# Patient Record
Sex: FEMALE | Race: BLACK OR AFRICAN AMERICAN | Employment: FULL TIME | URBAN - METROPOLITAN AREA
[De-identification: names, ages, dates, MRNs, and addresses within clinical notes are randomized per-mention and may not be internally consistent; named-entity substitution may affect disease eponyms.]

---

## 2021-11-29 ENCOUNTER — APPOINTMENT (OUTPATIENT)
Dept: GENERAL RADIOLOGY | Age: 51
End: 2021-11-29
Payer: COMMERCIAL

## 2021-11-29 ENCOUNTER — HOSPITAL ENCOUNTER (EMERGENCY)
Age: 51
Discharge: HOME | End: 2021-11-29
Payer: COMMERCIAL

## 2021-11-29 ENCOUNTER — APPOINTMENT (OUTPATIENT)
Dept: CT IMAGING | Age: 51
End: 2021-11-29
Payer: COMMERCIAL

## 2021-11-29 VITALS
SYSTOLIC BLOOD PRESSURE: 156 MMHG | TEMPERATURE: 98.3 F | HEART RATE: 71 BPM | BODY MASS INDEX: 28.51 KG/M2 | WEIGHT: 151 LBS | OXYGEN SATURATION: 98 % | DIASTOLIC BLOOD PRESSURE: 88 MMHG | RESPIRATION RATE: 16 BRPM | HEIGHT: 61 IN

## 2021-11-29 DIAGNOSIS — M25.561 ACUTE PAIN OF RIGHT KNEE: ICD-10-CM

## 2021-11-29 DIAGNOSIS — R10.84 GENERALIZED ABDOMINAL PAIN: Primary | ICD-10-CM

## 2021-11-29 LAB
A/G RATIO: 1.6 (ref 1.1–2.2)
ALBUMIN SERPL-MCNC: 4.2 G/DL (ref 3.4–5)
ALP BLD-CCNC: 65 U/L (ref 40–129)
ALT SERPL-CCNC: 20 U/L (ref 10–40)
ANION GAP SERPL CALCULATED.3IONS-SCNC: 11 MMOL/L (ref 3–16)
AST SERPL-CCNC: 24 U/L (ref 15–37)
BASOPHILS ABSOLUTE: 0 K/UL (ref 0–0.2)
BASOPHILS RELATIVE PERCENT: 0.6 %
BILIRUB SERPL-MCNC: <0.2 MG/DL (ref 0–1)
BILIRUBIN URINE: NEGATIVE
BLOOD, URINE: ABNORMAL
BUN BLDV-MCNC: 11 MG/DL (ref 7–20)
CALCIUM SERPL-MCNC: 9.4 MG/DL (ref 8.3–10.6)
CHLORIDE BLD-SCNC: 103 MMOL/L (ref 99–110)
CLARITY: ABNORMAL
CO2: 24 MMOL/L (ref 21–32)
COLOR: YELLOW
CREAT SERPL-MCNC: 0.8 MG/DL (ref 0.6–1.1)
EKG ATRIAL RATE: 55 BPM
EKG DIAGNOSIS: NORMAL
EKG P AXIS: 44 DEGREES
EKG P-R INTERVAL: 160 MS
EKG Q-T INTERVAL: 412 MS
EKG QRS DURATION: 84 MS
EKG QTC CALCULATION (BAZETT): 394 MS
EKG R AXIS: 13 DEGREES
EKG T AXIS: 32 DEGREES
EKG VENTRICULAR RATE: 55 BPM
EOSINOPHILS ABSOLUTE: 0 K/UL (ref 0–0.6)
EOSINOPHILS RELATIVE PERCENT: 0.1 %
EPITHELIAL CELLS, UA: ABNORMAL /HPF (ref 0–5)
GFR AFRICAN AMERICAN: >60
GFR NON-AFRICAN AMERICAN: >60
GLUCOSE BLD-MCNC: 94 MG/DL (ref 70–99)
GLUCOSE URINE: NEGATIVE MG/DL
HCG(URINE) PREGNANCY TEST: NEGATIVE
HCT VFR BLD CALC: 44 % (ref 36–48)
HEMOGLOBIN: 14.7 G/DL (ref 12–16)
KETONES, URINE: NEGATIVE MG/DL
LEUKOCYTE ESTERASE, URINE: NEGATIVE
LIPASE: 39 U/L (ref 13–60)
LYMPHOCYTES ABSOLUTE: 1.9 K/UL (ref 1–5.1)
LYMPHOCYTES RELATIVE PERCENT: 37.5 %
MCH RBC QN AUTO: 29.3 PG (ref 26–34)
MCHC RBC AUTO-ENTMCNC: 33.4 G/DL (ref 31–36)
MCV RBC AUTO: 87.5 FL (ref 80–100)
MICROSCOPIC EXAMINATION: YES
MONOCYTES ABSOLUTE: 0.6 K/UL (ref 0–1.3)
MONOCYTES RELATIVE PERCENT: 11.3 %
MUCUS: ABNORMAL /LPF
NEUTROPHILS ABSOLUTE: 2.5 K/UL (ref 1.7–7.7)
NEUTROPHILS RELATIVE PERCENT: 50.5 %
NITRITE, URINE: NEGATIVE
PDW BLD-RTO: 14.4 % (ref 12.4–15.4)
PH UA: 6.5 (ref 5–8)
PLATELET # BLD: 228 K/UL (ref 135–450)
PMV BLD AUTO: 8.7 FL (ref 5–10.5)
POTASSIUM REFLEX MAGNESIUM: 4 MMOL/L (ref 3.5–5.1)
PROTEIN UA: NEGATIVE MG/DL
RBC # BLD: 5.03 M/UL (ref 4–5.2)
RBC UA: ABNORMAL /HPF (ref 0–4)
SODIUM BLD-SCNC: 138 MMOL/L (ref 136–145)
SPECIFIC GRAVITY UA: 1.02 (ref 1–1.03)
TOTAL PROTEIN: 6.8 G/DL (ref 6.4–8.2)
TROPONIN: <0.01 NG/ML
URINE REFLEX TO CULTURE: ABNORMAL
URINE TYPE: ABNORMAL
UROBILINOGEN, URINE: 0.2 E.U./DL
WBC # BLD: 5 K/UL (ref 4–11)
WBC UA: ABNORMAL /HPF (ref 0–5)

## 2021-11-29 PROCEDURE — 96375 TX/PRO/DX INJ NEW DRUG ADDON: CPT

## 2021-11-29 PROCEDURE — 36415 COLL VENOUS BLD VENIPUNCTURE: CPT

## 2021-11-29 PROCEDURE — 93010 ELECTROCARDIOGRAM REPORT: CPT | Performed by: INTERNAL MEDICINE

## 2021-11-29 PROCEDURE — 6370000000 HC RX 637 (ALT 250 FOR IP): Performed by: NURSE PRACTITIONER

## 2021-11-29 PROCEDURE — 6360000002 HC RX W HCPCS: Performed by: NURSE PRACTITIONER

## 2021-11-29 PROCEDURE — 84484 ASSAY OF TROPONIN QUANT: CPT

## 2021-11-29 PROCEDURE — 2580000003 HC RX 258: Performed by: NURSE PRACTITIONER

## 2021-11-29 PROCEDURE — 83690 ASSAY OF LIPASE: CPT

## 2021-11-29 PROCEDURE — 71045 X-RAY EXAM CHEST 1 VIEW: CPT

## 2021-11-29 PROCEDURE — 74177 CT ABD & PELVIS W/CONTRAST: CPT

## 2021-11-29 PROCEDURE — 99284 EMERGENCY DEPT VISIT MOD MDM: CPT

## 2021-11-29 PROCEDURE — 6360000004 HC RX CONTRAST MEDICATION: Performed by: NURSE PRACTITIONER

## 2021-11-29 PROCEDURE — 96374 THER/PROPH/DIAG INJ IV PUSH: CPT

## 2021-11-29 PROCEDURE — 93005 ELECTROCARDIOGRAM TRACING: CPT | Performed by: NURSE PRACTITIONER

## 2021-11-29 PROCEDURE — 80053 COMPREHEN METABOLIC PANEL: CPT

## 2021-11-29 PROCEDURE — 84703 CHORIONIC GONADOTROPIN ASSAY: CPT

## 2021-11-29 PROCEDURE — 81001 URINALYSIS AUTO W/SCOPE: CPT

## 2021-11-29 PROCEDURE — 85025 COMPLETE CBC W/AUTO DIFF WBC: CPT

## 2021-11-29 RX ORDER — ONDANSETRON 2 MG/ML
4 INJECTION INTRAMUSCULAR; INTRAVENOUS ONCE
Status: COMPLETED | OUTPATIENT
Start: 2021-11-29 | End: 2021-11-29

## 2021-11-29 RX ORDER — 0.9 % SODIUM CHLORIDE 0.9 %
1000 INTRAVENOUS SOLUTION INTRAVENOUS ONCE
Status: COMPLETED | OUTPATIENT
Start: 2021-11-29 | End: 2021-11-29

## 2021-11-29 RX ORDER — DICYCLOMINE HYDROCHLORIDE 10 MG/1
10 CAPSULE ORAL
Qty: 20 CAPSULE | Refills: 0 | Status: SHIPPED | OUTPATIENT
Start: 2021-11-29 | End: 2021-11-29 | Stop reason: SDUPTHER

## 2021-11-29 RX ORDER — MORPHINE SULFATE 4 MG/ML
4 INJECTION, SOLUTION INTRAMUSCULAR; INTRAVENOUS ONCE
Status: COMPLETED | OUTPATIENT
Start: 2021-11-29 | End: 2021-11-29

## 2021-11-29 RX ORDER — DICYCLOMINE HYDROCHLORIDE 10 MG/1
10 CAPSULE ORAL
Qty: 20 CAPSULE | Refills: 0 | Status: SHIPPED | OUTPATIENT
Start: 2021-11-29 | End: 2021-12-02 | Stop reason: SDUPTHER

## 2021-11-29 RX ORDER — DICYCLOMINE HYDROCHLORIDE 10 MG/1
10 CAPSULE ORAL ONCE
Status: COMPLETED | OUTPATIENT
Start: 2021-11-29 | End: 2021-11-29

## 2021-11-29 RX ADMIN — DICYCLOMINE HYDROCHLORIDE 10 MG: 10 CAPSULE ORAL at 17:35

## 2021-11-29 RX ADMIN — ONDANSETRON 4 MG: 2 INJECTION INTRAMUSCULAR; INTRAVENOUS at 15:12

## 2021-11-29 RX ADMIN — MORPHINE SULFATE 4 MG: 4 INJECTION INTRAVENOUS at 15:12

## 2021-11-29 RX ADMIN — IOPAMIDOL 100 ML: 755 INJECTION, SOLUTION INTRAVENOUS at 16:00

## 2021-11-29 RX ADMIN — SODIUM CHLORIDE 1000 ML: 9 INJECTION, SOLUTION INTRAVENOUS at 15:09

## 2021-11-29 ASSESSMENT — PAIN DESCRIPTION - PAIN TYPE
TYPE: ACUTE PAIN
TYPE: ACUTE PAIN

## 2021-11-29 ASSESSMENT — PAIN SCALES - GENERAL
PAINLEVEL_OUTOF10: 6
PAINLEVEL_OUTOF10: 4
PAINLEVEL_OUTOF10: 10
PAINLEVEL_OUTOF10: 10

## 2021-11-29 ASSESSMENT — PAIN DESCRIPTION - LOCATION
LOCATION: ABDOMEN

## 2021-11-29 ASSESSMENT — PAIN DESCRIPTION - DESCRIPTORS: DESCRIPTORS: SHARP

## 2021-11-29 ASSESSMENT — PAIN - FUNCTIONAL ASSESSMENT: PAIN_FUNCTIONAL_ASSESSMENT: 0-10

## 2021-11-29 ASSESSMENT — PAIN DESCRIPTION - ORIENTATION: ORIENTATION: LOWER

## 2021-11-29 NOTE — ED PROVIDER NOTES
[facility] ENCOUNTER        Pt Name: Shameka Li  MRN: [id number]  [place] 1970  Date of evaluation: 2021  Provider: DEVAN Khalil CNP  PCP: No primary care provider on file. Note Started: 2:28 PM EST       HANNAH. I have evaluated this patient. My supervising physician was available for consultation. CHIEF COMPLAINT       Chief Complaint   Patient presents with    Abdominal Pain     across lower abd x 1 day, Denies N/V, had some diarrhea today    Knee Pain     right knee intermittent  pain and swelling for past year. Stated more difficult to walk       HISTORY OF PRESENT ILLNESS   (Location, Timing/Onset, Context/Setting, Quality, Duration, Modifying Factors, Severity, Associated Signs and Symptoms)  Note limiting factors. Chief Complaint: Abdominal pain    Shameka Li is a 49 y.o. female who presents urgency department with complaints of generalized abdominal pain and cramping since yesterday. Pain is progressively been getting worse and is now more sharp and stabbing in nature. Denies any history of similar symptoms. She is only been able to eat a few bites of food or she had a few bites of hamburger before she came in today. Denies having a recent EGD or colonoscopy. Denies prior any abdominal surgery except for a . Denies taking medication for the symptoms. Denies any associated nausea, vomiting, diarrhea, urinary symptoms, rashes, fevers, headache, neck pain, back pain, lightheaded, dizzy, confusion, syncope, leg swelling. She is also complaining of continued pain and swelling to the right knee. She has used an Ace wrap and taken NSAIDs without relief. She is asking for referral to PCP and orthopedist.     Nursing Notes were all reviewed and agreed with or any disagreements were addressed in the HPI.     REVIEW OF SYSTEMS    (2-9 systems for level 4, 10 or more for level 5)     Review of Systems    Positives and Pertinent negatives as per HPI. Except as noted above in the ROS, all other systems were reviewed and negative. PAST MEDICAL HISTORY   History reviewed. No pertinent past medical history. SURGICAL HISTORY     Past Surgical History:   Procedure Laterality Date     SECTION           CURRENTMEDICATIONS       Previous Medications    No medications on file         ALLERGIES     Patient has no known allergies. FAMILYHISTORY     History reviewed. No pertinent family history. SOCIAL HISTORY       Social History     Tobacco Use    Smoking status: Current Every Day Smoker     Packs/day: 0.25    Smokeless tobacco: Never Used   Substance Use Topics    Alcohol use: No    Drug use: Not Currently       SCREENINGS             PHYSICAL EXAM    (up to 7 for level 4, 8 or more for level 5)     ED Triage Vitals [21 1413]   BP Temp Temp Source Pulse Resp SpO2 Height Weight   (!) 156/88 98.3 °F (36.8 °C) Oral 71 16 98 % 5' 1\" (1.549 m) 151 lb (68.5 kg)       Physical Exam  Vitals and nursing note reviewed. Constitutional:       General: She is awake. Appearance: Normal appearance. She is well-developed and overweight. HENT:      Head: Normocephalic and atraumatic. Nose: Nose normal.   Eyes:      General:         Right eye: No discharge. Left eye: No discharge. Cardiovascular:      Rate and Rhythm: Normal rate and regular rhythm. Heart sounds: Normal heart sounds. Pulmonary:      Effort: Pulmonary effort is normal. No respiratory distress. Breath sounds: Normal breath sounds. Abdominal:      General: Bowel sounds are normal.      Palpations: Abdomen is soft. Tenderness: There is abdominal tenderness in the right upper quadrant, epigastric area and left upper quadrant. Musculoskeletal:         General: Normal range of motion. Cervical back: Normal range of motion. Skin:     General: Skin is warm and dry.       Coloration: Skin is not pale. Neurological:      Mental Status: She is alert and oriented to person, place, and time. Psychiatric:         Behavior: Behavior normal. Behavior is cooperative. DIAGNOSTIC RESULTS   LABS:    Labs Reviewed   URINE RT REFLEX TO CULTURE - Abnormal; Notable for the following components:       Result Value    Clarity, UA SL CLOUDY (*)     Blood, Urine MODERATE (*)     All other components within normal limits    Narrative:     Performed at:  [facility]  [address] [place], [place]   Phone [phone]   MICROSCOPIC URINALYSIS - Abnormal; Notable for the following components:    Mucus, UA 1+ (*)     Epithelial Cells, UA 11-20 (*)     All other components within normal limits    Narrative:     Performed at:  [facility]  [address] [place], [place]   Phone [phone]   PREGNANCY, URINE    Narrative:     Performed at:  [facility]  [address] [place], [place]   Phone [phone]   CBC WITH AUTO DIFFERENTIAL    Narrative:     Performed at:  [address] [facility] [address] [facility]  [address] [place], [place]   Phone [phone]   COMPREHENSIVE METABOLIC PANEL W/ REFLEX TO MG FOR LOW K    Narrative:     Performed at:  [facility]  [address] [place], [place]   Phone [phone]   TROPONIN    Narrative:     Performed at:  [address] [facility]  [address] [place], [place]   Phone [phone]   LIPASE    Narrative:     Performed at:  [address] [facility] [address] [facility] [address] [facility]  [address] [place], [place]   Phone [phone]       When ordered only abnormal lab results are displayed.  All other labs were within normal range or not returned as of this dictation. EKG: When ordered, EKG's are interpreted by the Emergency Department Physician in the absence of a cardiologist.  Please see their note for interpretation of EKG. RADIOLOGY:   Non-plain film images such as CT, Ultrasound and MRI are read by the radiologist. Plain radiographic images are visualized and preliminarily interpreted by the ED Provider with the below findings:        Interpretation per the Radiologist below, if available at the time of this note:    CT ABDOMEN PELVIS W IV CONTRAST Additional Contrast? None   Final Result   1. No evidence for bowel obstruction. 2. No acute infective or inflammatory process. XR CHEST PORTABLE   Final Result   No acute process. No results found. PROCEDURES   Unless otherwise noted below, none     Procedures    CRITICAL CARE TIME   N/A    CONSULTS:  None      EMERGENCY DEPARTMENT COURSE and DIFFERENTIAL DIAGNOSIS/MDM:   Vitals:    Vitals:    11/29/21 1413   BP: (!) 156/88   Pulse: 71   Resp: 16   Temp: 98.3 °F (36.8 °C)   TempSrc: Oral   SpO2: 98%   Weight: 151 lb (68.5 kg)   Height: 5' 1\" (1.549 m)       Patient was given the following medications:  Medications   dicyclomine (BENTYL) capsule 10 mg (has no administration in time range)   0.9 % sodium chloride bolus (1,000 mLs IntraVENous New Bag 11/29/21 1509)   morphine (PF) injection 4 mg (4 mg IntraVENous Given 11/29/21 1512)   ondansetron (ZOFRAN) injection 4 mg (4 mg IntraVENous Given 11/29/21 1512)   iopamidol (ISOVUE-370) 76 % injection 100 mL (100 mLs IntraVENous Given 11/29/21 1600)           Care of this patient took place during the COVID-19 pandemic emergency. ED COURSE & MEDICAL DECISION MAKING    - The patient presented to the ER with complaints of abd pain. Vital signs were reviewed. Exam well-developed, well-nourished female who appears uncomfortable. Peripheral IV placed. Labs, Imaging ordered. - Pertinent Labs & Imaging studies reviewed.  (See chart for details) -  Patient seen and evaluated in the emergency department. -  Triage and nursing notes reviewed and incorporated. -  Old chart records reviewed and incorporated. -  HANNAH. I have evaluated this patient. My supervising physician was available for consultation.  -  Differential diagnosis includes: pelvic inflammatory disease, TOA, ovarian torsion, kidney stone, pyelonephritis, UTI, BV/vaginitis, cervicitis, ovarian cysts, round ligament pain, pregnancy (including ectopic), appendicitis, bowel obstruction, diverticulitis, hernia, gastroenteritis, colitis vs COVID-19  -  Work-up included:  See above  -  ED treatment included:   Normal saline, morphine, Zofran, Bentyl  -  Results discussed with patient. Delfin Nageotte is a 81-year-old female with complaints of generalized abdominal pain that is sharp and cramping in nature. Pain has been constant for 2 days without aggravating alleviating factors. Denies history of similar symptoms. On exam she has generalized abdominal tenderness that is reproducible. Lab work and imaging is obtained. Urinalysis with cloudy clarity, moderate blood, 1+ mucus and 11-20 epithelial cells. hCG negative. Chest x-ray shows no acute process. CT abdomen and pelvis shows no evidence for bowel obstruction. No acute infective or inflammatory process. Patient was informed of these results. She was given Zofran, normal saline, morphine, and Bentyl in the emergency department states she is feeling better. She was instructed on home treatment and care. She was given strict return discharge instructions. Shared decision making is complete and patient is stable for discharge at this time. FINAL IMPRESSION      1. Generalized abdominal pain    2.  Acute pain of right knee          DISPOSITION/PLAN   DISPOSITION        PATIENT REFERRED TO:  Wade Brunson  [phone]  Call in 2 days  As needed, If symptoms worsen    [address]  [address] [address]  [phone]  Go to   As needed    Jim Hayes, [address] [facility]  [address] [place] [address]  [phone]    Call   As needed      DISCHARGE MEDICATIONS:  New Prescriptions    DICYCLOMINE (BENTYL) 10 MG CAPSULE    Take 1 capsule by mouth 4 times daily (before meals and nightly) for 20 doses       DISCONTINUED MEDICATIONS:  Discontinued Medications    IBUPROFEN (ADVIL;MOTRIN) 600 MG TABLET    Take 1 tablet by mouth every 6 hours as needed for Pain              (Please note that portions of this note were completed with a voice recognition program.  Efforts were made to edit the dictations but occasionally words are mis-transcribed.)    DEVAN Reddy CNP (electronically signed)            DEVAN Reddy CNP  11/29/21 6105

## 2021-11-29 NOTE — ED PROVIDER NOTES
This is an independent HANNAH patient encounter. I am available for consultation if needed. I did not perform a face-to-face evaluation of this patient and was not asked to see the patient. I was not made aware of any details of the patient's H&P or medical decision making but was asked to review and document this EKG. See my interpretation of the EKG below. I shared my findings and interpretation with the HANNAH for use in his/her independent management of this patient. See his/her note for details of the patient's history, physical, and all medical decision making.       The 12 lead EKG was interpreted by me as follows:  Rate: bradycardia with a rate of 55  Rhythm: sinus  Axis: normal  Intervals: normal NH, narrow QRS, normal QTc  ST segments: no ST elevations or depressions  T waves: no abnormal inversions  Non-specific T wave changes: not present  Prior EKG comparison: No prior is currently available for comparison          Rosa Sanabria MD  11/29/21 2838

## 2021-12-02 ENCOUNTER — TELEMEDICINE (OUTPATIENT)
Dept: INTERNAL MEDICINE CLINIC | Age: 51
End: 2021-12-02
Payer: COMMERCIAL

## 2021-12-02 DIAGNOSIS — R43.0 ANOSMIA: ICD-10-CM

## 2021-12-02 DIAGNOSIS — R10.30 LOWER ABDOMINAL PAIN: Primary | ICD-10-CM

## 2021-12-02 DIAGNOSIS — R43.2 LOSS OF TASTE: ICD-10-CM

## 2021-12-02 DIAGNOSIS — K59.00 CONSTIPATION, UNSPECIFIED CONSTIPATION TYPE: ICD-10-CM

## 2021-12-02 PROCEDURE — 1111F DSCHRG MED/CURRENT MED MERGE: CPT | Performed by: INTERNAL MEDICINE

## 2021-12-02 PROCEDURE — 99214 OFFICE O/P EST MOD 30 MIN: CPT | Performed by: INTERNAL MEDICINE

## 2021-12-02 RX ORDER — DICYCLOMINE HYDROCHLORIDE 10 MG/1
10 CAPSULE ORAL
Qty: 20 CAPSULE | Refills: 0 | Status: SHIPPED | OUTPATIENT
Start: 2021-12-02 | End: 2022-01-04

## 2021-12-02 RX ORDER — SENNA AND DOCUSATE SODIUM 50; 8.6 MG/1; MG/1
1 TABLET, FILM COATED ORAL NIGHTLY PRN
Qty: 10 TABLET | Refills: 1 | Status: SHIPPED | OUTPATIENT
Start: 2021-12-02 | End: 2022-01-04

## 2021-12-02 ASSESSMENT — ENCOUNTER SYMPTOMS
VOMITING: 0
BLOOD IN STOOL: 0
COUGH: 0
ABDOMINAL PAIN: 1
NAUSEA: 0
SHORTNESS OF BREATH: 0
CONSTIPATION: 1
SORE THROAT: 0

## 2021-12-02 NOTE — PROGRESS NOTES
Post-Discharge Transitional Care Management Services or Hospital Follow Up      Eliel Cheatham   YOB: 1970    Date of Office Visit:  12/2/2021  Date of Hospital Admission: 11/29/21  Date of Hospital Discharge: 11/29/21  Readmission Risk Score(high >=14%. Medium >=10%):No data recorded    Care management risk score Rising risk (score 2-5) and Complex Care (Scores >=6): 0     Non face to face  following discharge, date last encounter closed (first attempt may have been earlier): *No documented post hospital discharge outreach found in the last 14 days *No documented post hospital discharge outreach found in the last 14 days    Call initiated 2 business days of discharge: *No response recorded in the last 14 days     There is no problem list on file for this patient. No Known Allergies    Medications listed as ordered at the time of discharge from hospital  @DISCHARGEMEDSLIST(<NOROUTINE> error)@      Medications marked \"taking\" at this time  Outpatient Medications Marked as Taking for the 12/2/21 encounter (Telemedicine) with Lucie White MD   Medication Sig Dispense Refill    sennosides-docusate sodium (SENOKOT-S) 8.6-50 MG tablet Take 1 tablet by mouth nightly as needed for Constipation 10 tablet 1    dicyclomine (BENTYL) 10 MG capsule Take 1 capsule by mouth 4 times daily (before meals and nightly) for 20 doses 20 capsule 0        Medications patient taking as of now reconciled against medications ordered at time of hospital discharge: Yes    Chief Complaint   Patient presents with   [address] from Hospital       HPI    Inpatient course: Discharge summary reviewed- see chart. Interval history/Current status: Patient still has abdomen pain and constipation. Last bowel movement was a week ago. Reports loss of taste and smell since Monday. Review of Systems   Constitutional: Negative for fatigue and fever. HENT: Negative for nosebleeds and sore throat.          Anosmia and loss of taste   Respiratory: Negative for cough and shortness of breath. Cardiovascular: Negative for chest pain, palpitations and leg swelling. Gastrointestinal: Positive for abdominal pain and constipation. Negative for blood in stool, nausea and vomiting. Neurological: Negative for dizziness and weakness. There were no vitals filed for this visit. There is no height or weight on file to calculate BMI. Wt Readings from Last 3 Encounters:   11/29/21 151 lb (68.5 kg)     BP Readings from Last 3 Encounters:   11/29/21 (!) 156/88   06/18/17 (!) 158/98       Physical Exam        Assessment/Plan:  1. Lower abdominal pain  - SD DISCHARGE MEDS RECONCILED W/ CURRENT OUTPATIENT MED LIST  - dicyclomine (BENTYL) 10 MG capsule; Take 1 capsule by mouth 4 times daily (before meals and nightly) for 20 doses  Dispense: 20 capsule; Refill: 0    2. Constipation, unspecified constipation type  - SD DISCHARGE MEDS RECONCILED W/ CURRENT OUTPATIENT MED LIST  - sennosides-docusate sodium (SENOKOT-S) 8.6-50 MG tablet; Take 1 tablet by mouth nightly as needed for Constipation  Dispense: 10 tablet; Refill: 1    3. Loss of taste  - COVID-19; Future    4. Anosmia  - COVID-19; Future        Medical Decision Making: high complexity      Mauricio Ojeda, was evaluated through a synchronous (real-time) audio encounter. The patient (or guardian if applicable) is aware that this is a billable service. Verbal consent to proceed has been obtained within the past 12 months. The visit was conducted pursuant to the emergency declaration under the [address] [place] [address] authority and the [employer] and [employer] Act. Patient identification was verified, and a caregiver was present when appropriate. The patient was located in a state where the provider was credentialed to provide care.       --Marla Spangler MD on 12/2/2021 at 4:49 PM    An electronic signature was used to authenticate this note.

## 2021-12-03 DIAGNOSIS — R43.2 LOSS OF TASTE: ICD-10-CM

## 2021-12-03 DIAGNOSIS — R43.0 ANOSMIA: ICD-10-CM

## 2021-12-04 LAB — SARS-COV-2: DETECTED

## 2022-01-04 ENCOUNTER — OFFICE VISIT (OUTPATIENT)
Dept: INTERNAL MEDICINE CLINIC | Age: 52
End: 2022-01-04
Payer: COMMERCIAL

## 2022-01-04 VITALS
SYSTOLIC BLOOD PRESSURE: 122 MMHG | OXYGEN SATURATION: 97 % | BODY MASS INDEX: 29.6 KG/M2 | DIASTOLIC BLOOD PRESSURE: 78 MMHG | WEIGHT: 156.8 LBS | HEART RATE: 62 BPM | HEIGHT: 61 IN

## 2022-01-04 DIAGNOSIS — Z12.11 COLON CANCER SCREENING: ICD-10-CM

## 2022-01-04 DIAGNOSIS — Z23 NEED FOR DIPHTHERIA-TETANUS-PERTUSSIS (TDAP) VACCINE: ICD-10-CM

## 2022-01-04 DIAGNOSIS — Z11.4 SCREENING FOR HUMAN IMMUNODEFICIENCY VIRUS: ICD-10-CM

## 2022-01-04 DIAGNOSIS — Z00.01 ENCOUNTER FOR WELL ADULT EXAM WITH ABNORMAL FINDINGS: Primary | ICD-10-CM

## 2022-01-04 DIAGNOSIS — R20.2 NUMBNESS AND TINGLING IN BOTH HANDS: ICD-10-CM

## 2022-01-04 DIAGNOSIS — Z11.59 NEED FOR HEPATITIS C SCREENING TEST: ICD-10-CM

## 2022-01-04 DIAGNOSIS — Z12.4 CERVICAL CANCER SCREENING: ICD-10-CM

## 2022-01-04 DIAGNOSIS — Z12.31 ENCOUNTER FOR SCREENING MAMMOGRAM FOR MALIGNANT NEOPLASM OF BREAST: ICD-10-CM

## 2022-01-04 DIAGNOSIS — M25.461 SWOLLEN R KNEE: ICD-10-CM

## 2022-01-04 DIAGNOSIS — R20.0 NUMBNESS AND TINGLING IN BOTH HANDS: ICD-10-CM

## 2022-01-04 PROCEDURE — 90715 TDAP VACCINE 7 YRS/> IM: CPT | Performed by: INTERNAL MEDICINE

## 2022-01-04 PROCEDURE — G8484 FLU IMMUNIZE NO ADMIN: HCPCS | Performed by: INTERNAL MEDICINE

## 2022-01-04 PROCEDURE — 99396 PREV VISIT EST AGE 40-64: CPT | Performed by: INTERNAL MEDICINE

## 2022-01-04 SDOH — ECONOMIC STABILITY: FOOD INSECURITY: WITHIN THE PAST 12 MONTHS, YOU WORRIED THAT YOUR FOOD WOULD RUN OUT BEFORE YOU GOT MONEY TO BUY MORE.: OFTEN TRUE

## 2022-01-04 SDOH — ECONOMIC STABILITY: FOOD INSECURITY: WITHIN THE PAST 12 MONTHS, THE FOOD YOU BOUGHT JUST DIDN'T LAST AND YOU DIDN'T HAVE MONEY TO GET MORE.: OFTEN TRUE

## 2022-01-04 ASSESSMENT — PATIENT HEALTH QUESTIONNAIRE - PHQ9
SUM OF ALL RESPONSES TO PHQ QUESTIONS 1-9: 0
SUM OF ALL RESPONSES TO PHQ QUESTIONS 1-9: 0
1. LITTLE INTEREST OR PLEASURE IN DOING THINGS: 0
SUM OF ALL RESPONSES TO PHQ QUESTIONS 1-9: 0
SUM OF ALL RESPONSES TO PHQ9 QUESTIONS 1 & 2: 0
2. FEELING DOWN, DEPRESSED OR HOPELESS: 0
SUM OF ALL RESPONSES TO PHQ QUESTIONS 1-9: 0

## 2022-01-04 ASSESSMENT — SOCIAL DETERMINANTS OF HEALTH (SDOH): HOW HARD IS IT FOR YOU TO PAY FOR THE VERY BASICS LIKE FOOD, HOUSING, MEDICAL CARE, AND HEATING?: SOMEWHAT HARD

## 2022-01-04 NOTE — PROGRESS NOTES
2022    Keila Nicolas (:  1970) is a 49 y.o. female, here for a preventive medicine evaluation. Patient Active Problem List   Diagnosis    Numbness and tingling in both hands    Swollen R knee       Review of Systems   Constitutional: Negative for fatigue and fever. HENT: Negative for nosebleeds and sore throat. Respiratory: Negative for cough and shortness of breath. Cardiovascular: Negative for chest pain, palpitations and leg swelling. Gastrointestinal: Negative for abdominal pain, blood in stool, nausea and vomiting. Musculoskeletal: Positive for joint swelling. Neurological: Positive for numbness. Negative for dizziness and weakness. Prior to Visit Medications    Not on File        No Known Allergies    History reviewed. No pertinent past medical history. Past Surgical History:   Procedure Laterality Date     SECTION         Social History     Socioeconomic History    Marital status:      Spouse name: Not on file    Number of children: Not on file    Years of education: Not on file    Highest education level: Not on file   Occupational History    Not on file   Tobacco Use    Smoking status: Current Every Day Smoker     Packs/day: 0.25    Smokeless tobacco: Never Used   Vaping Use    Vaping Use: Never used   Substance and Sexual Activity    Alcohol use: No    Drug use: Yes     Types: Marijuana Lanpradeep Potts)    Sexual activity: Not on file   Other Topics Concern    Not on file   Social History Narrative    Not on file     Social Determinants of Health     Financial Resource Strain: Medium Risk    Difficulty of Paying Living Expenses: Somewhat hard   Food Insecurity: Food Insecurity Present    Worried About Running Out of Food in the Last Year: Often true    Lucila of Food in the Last Year: Often true   Transportation Needs:     Lack of Transportation (Medical): Not on file    Lack of Transportation (Non-Medical):  Not on file   Physical Activity:     Days of Exercise per Week: Not on file    Minutes of Exercise per Session: Not on file   Stress:     Feeling of Stress : Not on file   Social Connections:     Frequency of Communication with Friends and Family: Not on file    Frequency of Social Gatherings with Friends and Family: Not on file    Attends Mandaen Services: Not on file    Active Member of Clubs or Organizations: Not on file    Attends Club or Organization Meetings: Not on file    Marital Status: Not on file   Intimate Partner Violence:     Fear of Current or Ex-Partner: Not on file    Emotionally Abused: Not on file    Physically Abused: Not on file    Sexually Abused: Not on file   Housing Stability:     Unable to Pay for Housing in the Last Year: Not on file    Number of [place] in the Last Year: Not on file    Unstable Housing in the Last Year: Not on file        History reviewed. No pertinent family history. ADVANCE DIRECTIVE: N, <no information>    Vitals:    01/04/22 1433   BP: 122/78   Pulse: 62   SpO2: 97%   Weight: 156 lb 12.8 oz (71.1 kg)   Height: 5' 1\" (1.549 m)     Estimated body mass index is 29.63 kg/m² as calculated from the following:    Height as of this encounter: 5' 1\" (1.549 m). Weight as of this encounter: 156 lb 12.8 oz (71.1 kg). Physical Exam  Constitutional:       Appearance: Normal appearance. HENT:      Head: Normocephalic and atraumatic. Eyes:      General: No scleral icterus. Conjunctiva/sclera: Conjunctivae normal.   Cardiovascular:      Rate and Rhythm: Normal rate and regular rhythm. Pulses: Normal pulses. Heart sounds: Normal heart sounds. Pulmonary:      Effort: Pulmonary effort is normal.      Breath sounds: Normal breath sounds. Musculoskeletal:         General: No swelling. Right knee: Swelling and effusion present. Skin:     General: Skin is warm and dry. Neurological:      General: No focal deficit present.       Mental Status: She is alert and oriented to person, place, and time. Mental status is at baseline. Motor: No weakness. Psychiatric:         Mood and Affect: Mood normal.         Behavior: Behavior normal.         No flowsheet data found. Lab Results   Component Value Date    GLUCOSE 94 11/29/2021       The ASCVD Risk score (Shaylee Moreno, et al., 2013) failed to calculate for the following reasons:    Cannot find a previous HDL lab    Cannot find a previous total cholesterol lab    Immunization History   Administered Date(s) Administered    Tdap (Boostrix, Adacel) 01/04/2022       Health Maintenance   Topic Date Due    Hepatitis C screen  Never done    COVID-19 Vaccine (1) Never done    Pneumococcal 0-64 years Vaccine (1 of 2 - PPSV23) Never done    HIV screen  Never done    Cervical cancer screen  Never done    Lipid screen  Never done    Colon cancer screen colonoscopy  Never done    Flu vaccine (1) Never done    Breast cancer screen  Never done    Shingles Vaccine (1 of 2) Never done    Depression Screen  01/04/2023    DTaP/Tdap/Td vaccine (2 - Td or Tdap) 01/04/2032    Hepatitis A vaccine  Aged Out    Hepatitis B vaccine  Aged Out    Hib vaccine  Aged Out    Meningococcal (ACWY) vaccine  Aged Out          ASSESSMENT/PLAN:  1. Encounter for well adult exam with abnormal findings  -     Hemoglobin A1C; Future  -     Hepatitis C Antibody; Future  -     Lipid Panel; Future  -     TSH with Reflex; Future  -     HIV Screen; Future  2. Numbness and tingling in both hands  -     Darius Wu MD, Orthopedic Surgery, [facility]  3. Swollen R knee  -     Darius uW MD, Orthopedic Surgery, [facility]  4. Need for hepatitis C screening test  -     Hepatitis C Antibody; Future  5. Screening for human immunodeficiency virus  -     HIV Screen; Future  6. Encounter for screening mammogram for malignant neoplasm of breast  -     EUSEBIO DIGITAL SCREEN W OR WO CAD BILATERAL; Future  7.  Cervical cancer screening  -     AFL - Sharon Parks MD, Gynecology, [facility]  8. Colon cancer screening  -     COLOGUARD (FECAL DNA COLORECTAL CANCER SCREENING)  9. Need for diphtheria-tetanus-pertussis (Tdap) vaccine  -     Tdap (age 6y and older) IM (Jesse Layer)      Return in about 1 year (around 1/4/2023). An electronic signature was used to authenticate this note.     --Iron Mari MD on 1/5/2022 at 9:09 AM

## 2022-01-05 ASSESSMENT — ENCOUNTER SYMPTOMS
VOMITING: 0
NAUSEA: 0
ABDOMINAL PAIN: 0
SHORTNESS OF BREATH: 0
BLOOD IN STOOL: 0
SORE THROAT: 0
COUGH: 0

## 2022-01-10 ENCOUNTER — OFFICE VISIT (OUTPATIENT)
Dept: ORTHOPEDIC SURGERY | Age: 52
End: 2022-01-10
Payer: COMMERCIAL

## 2022-01-10 VITALS — BODY MASS INDEX: 29.45 KG/M2 | RESPIRATION RATE: 12 BRPM | HEIGHT: 61 IN | WEIGHT: 156 LBS

## 2022-01-10 DIAGNOSIS — M70.41 PREPATELLAR BURSITIS, RIGHT KNEE: ICD-10-CM

## 2022-01-10 DIAGNOSIS — Z20.822 ENCOUNTER FOR PREPROCEDURE SCREENING LABORATORY TESTING FOR COVID-19: ICD-10-CM

## 2022-01-10 DIAGNOSIS — Z01.812 ENCOUNTER FOR PREPROCEDURE SCREENING LABORATORY TESTING FOR COVID-19: ICD-10-CM

## 2022-01-10 DIAGNOSIS — M25.561 CHRONIC PAIN OF RIGHT KNEE: Primary | ICD-10-CM

## 2022-01-10 DIAGNOSIS — G89.29 CHRONIC PAIN OF RIGHT KNEE: Primary | ICD-10-CM

## 2022-01-10 PROCEDURE — 4004F PT TOBACCO SCREEN RCVD TLK: CPT | Performed by: ORTHOPAEDIC SURGERY

## 2022-01-10 PROCEDURE — G8419 CALC BMI OUT NRM PARAM NOF/U: HCPCS | Performed by: ORTHOPAEDIC SURGERY

## 2022-01-10 PROCEDURE — L1830 KO IMMOB CANVAS LONG PRE OTS: HCPCS | Performed by: ORTHOPAEDIC SURGERY

## 2022-01-10 PROCEDURE — 99204 OFFICE O/P NEW MOD 45 MIN: CPT | Performed by: ORTHOPAEDIC SURGERY

## 2022-01-10 PROCEDURE — G8427 DOCREV CUR MEDS BY ELIG CLIN: HCPCS | Performed by: ORTHOPAEDIC SURGERY

## 2022-01-10 PROCEDURE — 3017F COLORECTAL CA SCREEN DOC REV: CPT | Performed by: ORTHOPAEDIC SURGERY

## 2022-01-10 PROCEDURE — G8484 FLU IMMUNIZE NO ADMIN: HCPCS | Performed by: ORTHOPAEDIC SURGERY

## 2022-01-10 NOTE — PROGRESS NOTES
Lo Evangelista today for chronic right prepatellar bursitis. She has had episodes of knee swelling dating back to 2017. It is never gotten all the way better but it never gets much worse. She has been in the ER multiple times. She denies trauma. It hurts 8 out of 10 with direct pressure and with stairs. She does not work. She smokes. She has never had aspiration. She sometimes takes Tylenol for it. History: Patient's relevant past family, medical, and social history are reviewed as part of today's visit. ROS of pertinent positives and negatives as above; otherwise negative. General Exam:    Vitals: Resp. rate 12, height 5' 1\" (1.549 m), weight 156 lb (70.8 kg). Constitutional: Patient is adequately groomed with no evidence of malnutrition  Mental Status: The patient is oriented to time, place and person. The patient's mood and affect are appropriate. Gait:  Patient walks with normal gait and station. Lymphatic: The lymphatic examination bilaterally reveals all areas to be without enlargement or induration. Vascular: Examination reveals no swelling or calf tenderness. Peripheral pulses are palpable and 2+. Neurological: The patient has good coordination. There is no weakness or sensory deficit. Skin:    Head/Neck: inspection reveals no rashes, ulcerations or lesions. Trunk:  inspection reveals no rashes, ulcerations or lesions. Right Lower Extremity: inspection reveals no rashes, ulcerations or lesions. Left Lower Extremity: inspection reveals no rashes, ulcerations or lesions. Examination of the bilateral hips reveals normal flexion and extension. There is no restriction in rotation. There is no tenderness to palpation anteriorly posteriorly or laterally. Left knee examination demonstrates no effusion. There is no tenderness to palpation over the medial or lateral joint line. There is no discomfort over the patellar tendon.   There is no palpable popliteal cyst.  Sensation is

## 2022-01-12 ENCOUNTER — TELEPHONE (OUTPATIENT)
Dept: ORTHOPEDIC SURGERY | Age: 52
End: 2022-01-12

## 2022-01-12 NOTE — TELEPHONE ENCOUNTER
CPT: 99437  BODY PART: right knee  STATUS: outpatient  AUTHORIZATION: [address]    Per Tyree Mayer website, [address]

## 2022-01-20 ENCOUNTER — OFFICE VISIT (OUTPATIENT)
Dept: INTERNAL MEDICINE CLINIC | Age: 52
End: 2022-01-20
Payer: COMMERCIAL

## 2022-01-20 VITALS
BODY MASS INDEX: 29.53 KG/M2 | WEIGHT: 156.4 LBS | SYSTOLIC BLOOD PRESSURE: 124 MMHG | DIASTOLIC BLOOD PRESSURE: 70 MMHG | HEIGHT: 61 IN | OXYGEN SATURATION: 97 % | HEART RATE: 72 BPM

## 2022-01-20 DIAGNOSIS — Z01.818 PREOPERATIVE CLEARANCE: Primary | ICD-10-CM

## 2022-01-20 PROCEDURE — 3017F COLORECTAL CA SCREEN DOC REV: CPT | Performed by: INTERNAL MEDICINE

## 2022-01-20 PROCEDURE — G8419 CALC BMI OUT NRM PARAM NOF/U: HCPCS | Performed by: INTERNAL MEDICINE

## 2022-01-20 PROCEDURE — G8484 FLU IMMUNIZE NO ADMIN: HCPCS | Performed by: INTERNAL MEDICINE

## 2022-01-20 PROCEDURE — G8427 DOCREV CUR MEDS BY ELIG CLIN: HCPCS | Performed by: INTERNAL MEDICINE

## 2022-01-20 PROCEDURE — 93000 ELECTROCARDIOGRAM COMPLETE: CPT | Performed by: INTERNAL MEDICINE

## 2022-01-20 PROCEDURE — 99214 OFFICE O/P EST MOD 30 MIN: CPT | Performed by: INTERNAL MEDICINE

## 2022-01-20 PROCEDURE — 4004F PT TOBACCO SCREEN RCVD TLK: CPT | Performed by: INTERNAL MEDICINE

## 2022-01-20 ASSESSMENT — ENCOUNTER SYMPTOMS
COUGH: 0
VOMITING: 0
NAUSEA: 0
ABDOMINAL PAIN: 0
BLOOD IN STOOL: 0
SHORTNESS OF BREATH: 0
SORE THROAT: 0

## 2022-01-20 NOTE — PROGRESS NOTES
PRE-OP HISTORY AND PHYSICAL             Date: 2022        Patient Name: Connor Farah     YOB: 1970      Age:  49 y.o. Chief Complaint     Chief Complaint   Patient presents with    Pre-op Exam          History Obtained From   Patient    History of Present Illness   Presents to office today for pre-op clearance for  RIGHT KNEE OPEN EXCISION PREPATELLAR BURSA  scheduled on  2022  with  Helen Lynn MD.    Past Medical History   History reviewed. No pertinent past medical history. Past Surgical History     Past Surgical History:   Procedure Laterality Date     SECTION          Medications Prior to Admission     Prior to Admission medications    Not on File        Allergies   Patient has no known allergies. Social History     Social History     Tobacco History     Smoking Status  Current Every Day Smoker Smoking Frequency  0.25 packs/day    Smokeless Tobacco Use  Never Used          Alcohol History     Alcohol Use Status  No          Drug Use     Drug Use Status  Yes Types  Marijuana (Dinora Gloss)          Sexual Activity     Sexually Active  Not Asked                Family History   History reviewed. No pertinent family history. Review of Systems   Review of Systems   Constitutional: Negative for fatigue and fever. HENT: Negative for nosebleeds and sore throat. Respiratory: Negative for cough and shortness of breath. Cardiovascular: Negative for chest pain, palpitations and leg swelling. Gastrointestinal: Negative for abdominal pain, blood in stool, nausea and vomiting. Neurological: Negative for dizziness and weakness. No personal or family (blood relatives) have had a problem following an anaesthetic  No history of liver disease  Physical Exam   /70   Pulse 72   Ht 5' 1\" (1.549 m)   Wt 156 lb 6.4 oz (70.9 kg)   SpO2 97%   BMI 29.55 kg/m²     Physical Exam  Constitutional:       Appearance: Normal appearance.    HENT:      Head: Normocephalic and atraumatic. Eyes:      General: No scleral icterus. Conjunctiva/sclera: Conjunctivae normal.   Cardiovascular:      Rate and Rhythm: Normal rate and regular rhythm. Pulses: Normal pulses. Heart sounds: Normal heart sounds. Pulmonary:      Effort: Pulmonary effort is normal.      Breath sounds: Normal breath sounds. Musculoskeletal:         General: No swelling. Skin:     General: Skin is warm and dry. Neurological:      Mental Status: She is alert and oriented to person, place, and time. Mental status is at baseline. Psychiatric:         Mood and Affect: Mood normal.         Behavior: Behavior normal.         Labs      Orders Only on 12/03/2021   Component Date Value Ref Range Status    SARS-CoV-2 12/03/2021 Detected* Not detected Final    Comment: This test has been authorized by FDA under an  Emergency Use Authorization (EUA). This test is only authorized for the duration of the  time of declaration that circumstances exist justifying the  authorization of the emergency use of in vitro diagnostic  testing for detection of the SARS-CoV-2 virus  and/or diagnosis of COVID-19 infection under  section 564 (b)(1) of the Act, 21 U. S.C. 728KKJ-5 (b) (1),  unless the authorization is terminated or revoked sooner. Patient Fact [employer]  Provider Fact [employer]    METHODOLOGY: RT PCR            Assessment & Plan   1. Preoperative clearance  Patient is a low risk candidate for a surgery with low risk. RCRI score of 0 (Class I Risk). Her functional capacity corresponds to > 4 METS. She may proceed with the surgery as scheduled. - COVID-19; Future  - EKG 12 Lead; Future  - CBC Auto Differential; Future  - Comprehensive Metabolic Panel;  Future         Electronically signed by Prisca Mccabe MD on 1/20/22 at 2:14 PM EST

## 2022-01-25 ENCOUNTER — NURSE ONLY (OUTPATIENT)
Dept: INTERNAL MEDICINE CLINIC | Age: 52
End: 2022-01-25
Payer: COMMERCIAL

## 2022-01-25 DIAGNOSIS — Z11.4 SCREENING FOR HUMAN IMMUNODEFICIENCY VIRUS: ICD-10-CM

## 2022-01-25 DIAGNOSIS — Z00.01 ENCOUNTER FOR WELL ADULT EXAM WITH ABNORMAL FINDINGS: ICD-10-CM

## 2022-01-25 DIAGNOSIS — Z11.59 NEED FOR HEPATITIS C SCREENING TEST: ICD-10-CM

## 2022-01-25 DIAGNOSIS — Z01.818 PREOPERATIVE CLEARANCE: ICD-10-CM

## 2022-01-25 LAB
A/G RATIO: 1.7 (ref 1.1–2.2)
ALBUMIN SERPL-MCNC: 4.7 G/DL (ref 3.4–5)
ALP BLD-CCNC: 68 U/L (ref 40–129)
ALT SERPL-CCNC: 8 U/L (ref 10–40)
ANION GAP SERPL CALCULATED.3IONS-SCNC: 12 MMOL/L (ref 3–16)
AST SERPL-CCNC: 13 U/L (ref 15–37)
BASOPHILS ABSOLUTE: 0 K/UL (ref 0–0.2)
BASOPHILS RELATIVE PERCENT: 0.4 %
BILIRUB SERPL-MCNC: 0.5 MG/DL (ref 0–1)
BUN BLDV-MCNC: 9 MG/DL (ref 7–20)
CALCIUM SERPL-MCNC: 9.9 MG/DL (ref 8.3–10.6)
CHLORIDE BLD-SCNC: 102 MMOL/L (ref 99–110)
CHOLESTEROL, TOTAL: 242 MG/DL (ref 0–199)
CO2: 26 MMOL/L (ref 21–32)
CREAT SERPL-MCNC: 0.8 MG/DL (ref 0.6–1.1)
EOSINOPHILS ABSOLUTE: 0 K/UL (ref 0–0.6)
EOSINOPHILS RELATIVE PERCENT: 0.6 %
GFR AFRICAN AMERICAN: >60
GFR NON-AFRICAN AMERICAN: >60
GLUCOSE BLD-MCNC: 106 MG/DL (ref 70–99)
HCT VFR BLD CALC: 42.7 % (ref 36–48)
HDLC SERPL-MCNC: 75 MG/DL (ref 40–60)
HEMOGLOBIN: 13.8 G/DL (ref 12–16)
HEPATITIS C ANTIBODY INTERPRETATION: NORMAL
LDL CHOLESTEROL CALCULATED: 147 MG/DL
LYMPHOCYTES ABSOLUTE: 2 K/UL (ref 1–5.1)
LYMPHOCYTES RELATIVE PERCENT: 37.2 %
MCH RBC QN AUTO: 28.6 PG (ref 26–34)
MCHC RBC AUTO-ENTMCNC: 32.3 G/DL (ref 31–36)
MCV RBC AUTO: 88.5 FL (ref 80–100)
MONOCYTES ABSOLUTE: 0.4 K/UL (ref 0–1.3)
MONOCYTES RELATIVE PERCENT: 7.2 %
NEUTROPHILS ABSOLUTE: 3 K/UL (ref 1.7–7.7)
NEUTROPHILS RELATIVE PERCENT: 54.6 %
PDW BLD-RTO: 15.2 % (ref 12.4–15.4)
PLATELET # BLD: 278 K/UL (ref 135–450)
PMV BLD AUTO: 8.9 FL (ref 5–10.5)
POTASSIUM SERPL-SCNC: 4.1 MMOL/L (ref 3.5–5.1)
RBC # BLD: 4.82 M/UL (ref 4–5.2)
SODIUM BLD-SCNC: 140 MMOL/L (ref 136–145)
TOTAL PROTEIN: 7.4 G/DL (ref 6.4–8.2)
TRIGL SERPL-MCNC: 101 MG/DL (ref 0–150)
TSH REFLEX: 1.9 UIU/ML (ref 0.27–4.2)
VLDLC SERPL CALC-MCNC: 20 MG/DL
WBC # BLD: 5.5 K/UL (ref 4–11)

## 2022-01-25 PROCEDURE — 36415 COLL VENOUS BLD VENIPUNCTURE: CPT | Performed by: INTERNAL MEDICINE

## 2022-01-25 PROCEDURE — 99999 PR OFFICE/OUTPT VISIT,PROCEDURE ONLY: CPT | Performed by: INTERNAL MEDICINE

## 2022-01-26 LAB
ESTIMATED AVERAGE GLUCOSE: 105.4 MG/DL
HBA1C MFR BLD: 5.3 %
HIV AG/AB: NORMAL
HIV ANTIGEN: NORMAL
HIV-1 ANTIBODY: NORMAL
HIV-2 AB: NORMAL

## 2022-01-28 DIAGNOSIS — Z01.818 PREOPERATIVE CLEARANCE: ICD-10-CM

## 2022-01-29 LAB — SARS-COV-2: NOT DETECTED

## 2022-01-31 RX ORDER — ACETAMINOPHEN 325 MG/1
650 TABLET ORAL EVERY 6 HOURS PRN
COMMUNITY
End: 2022-02-24

## 2022-01-31 NOTE — PROGRESS NOTES
[address] time____0820________        Surgery time_____1020______    Take the following medications with a sip of water: Follow your MD/Surgeons pre-procedure instructions regarding your medications    Do not eat or drink anything after 12:00 midnight prior to your surgery. This includes water chewing gum, mints and ice chips. You may brush your teeth and gargle the morning of your surgery, but do not swallow the water     Please see your family doctor/pediatrician for a history and physical and/or concerning medications. Bring any test results/reports from your physicians office. If you are under the care of a heart doctor or specialist doctor, please be aware that you may be asked to them for clearance    You may be asked to stop blood thinners such as Coumadin, Plavix, Fragmin, Lovenox, etc., or any anti-inflammatories such as:  Aspirin, Ibuprofen, Advil, Naproxen prior to your surgery. We also ask that you stop any OTC medications such as fish oil, vitamin E, glucosamine, garlic, Multivitamins, COQ 10, etc.    We ask that you do not smoke 24 hours prior to surgery  We ask that you do not  drink any alcoholic beverages 24 hours prior to surgery     You must make arrangements for a responsible adult to take you home after your surgery. For your safety you will not be allowed to leave alone or drive yourself home. Your surgery will be cancelled if you do not have a ride home. Also for your safety, it is strongly suggested that someone stay with you the first 24 hours after your surgery. A parent or legal guardian must accompany a child scheduled for surgery and plan to stay at the hospital until the child is discharged. Please do not bring other children with you. For your comfort, please wear simple loose fitting clothing to the hospital.  Please do not bring valuables.     Do not wear any make-up or nail polish on your fingers or

## 2022-01-31 NOTE — PROGRESS NOTES
Covid testing to be done @ [facility] on 1/28/22  If positive---Pt instructed to notify MD ASAP   If negative--pt was instructed to bring results DOP/DOS

## 2022-01-31 NOTE — PROGRESS NOTES
Covid testing to be done @ [facility] on 1/28/22  If positive---Pt instructed to notify MD ASAP   If negative--pt was instructed to bring results DOP/DOS

## 2022-02-01 ENCOUNTER — ANESTHESIA EVENT (OUTPATIENT)
Dept: OPERATING ROOM | Age: 52
End: 2022-02-01
Payer: COMMERCIAL

## 2022-02-01 DIAGNOSIS — G89.29 CHRONIC PAIN OF RIGHT KNEE: Primary | ICD-10-CM

## 2022-02-01 DIAGNOSIS — M70.41 PREPATELLAR BURSITIS, RIGHT KNEE: ICD-10-CM

## 2022-02-01 DIAGNOSIS — M25.561 CHRONIC PAIN OF RIGHT KNEE: Primary | ICD-10-CM

## 2022-02-01 RX ORDER — DOCUSATE SODIUM 100 MG/1
100 CAPSULE, LIQUID FILLED ORAL 2 TIMES DAILY
Qty: 60 CAPSULE | Refills: 0 | Status: SHIPPED | OUTPATIENT
Start: 2022-02-02 | End: 2022-02-24

## 2022-02-01 RX ORDER — HYDROCODONE BITARTRATE AND ACETAMINOPHEN 5; 325 MG/1; MG/1
1 TABLET ORAL EVERY 4 HOURS PRN
Qty: 20 TABLET | Refills: 0 | Status: SHIPPED | OUTPATIENT
Start: 2022-02-02 | End: 2022-02-07

## 2022-02-01 RX ORDER — PROMETHAZINE HYDROCHLORIDE 25 MG/1
25 TABLET ORAL EVERY 6 HOURS PRN
Qty: 30 TABLET | Refills: 0 | Status: SHIPPED | OUTPATIENT
Start: 2022-02-02 | End: 2022-02-08

## 2022-02-02 ENCOUNTER — ANESTHESIA (OUTPATIENT)
Dept: OPERATING ROOM | Age: 52
End: 2022-02-02
Payer: COMMERCIAL

## 2022-02-02 ENCOUNTER — HOSPITAL ENCOUNTER (OUTPATIENT)
Age: 52
Setting detail: OUTPATIENT SURGERY
Discharge: HOME | End: 2022-02-02
Attending: ORTHOPAEDIC SURGERY | Admitting: ORTHOPAEDIC SURGERY
Payer: COMMERCIAL

## 2022-02-02 VITALS
OXYGEN SATURATION: 100 % | BODY MASS INDEX: 28.66 KG/M2 | WEIGHT: 151.79 LBS | HEART RATE: 47 BPM | RESPIRATION RATE: 16 BRPM | TEMPERATURE: 97 F | HEIGHT: 61 IN | SYSTOLIC BLOOD PRESSURE: 155 MMHG | DIASTOLIC BLOOD PRESSURE: 76 MMHG

## 2022-02-02 VITALS
OXYGEN SATURATION: 100 % | SYSTOLIC BLOOD PRESSURE: 90 MMHG | DIASTOLIC BLOOD PRESSURE: 55 MMHG | RESPIRATION RATE: 7 BRPM

## 2022-02-02 DIAGNOSIS — M70.41 PREPATELLAR BURSITIS OF RIGHT KNEE: ICD-10-CM

## 2022-02-02 PROCEDURE — 3600000014 HC SURGERY LEVEL 4 ADDTL 15MIN: Performed by: ORTHOPAEDIC SURGERY

## 2022-02-02 PROCEDURE — 2580000003 HC RX 258: Performed by: ORTHOPAEDIC SURGERY

## 2022-02-02 PROCEDURE — 6370000000 HC RX 637 (ALT 250 FOR IP): Performed by: ANESTHESIOLOGY

## 2022-02-02 PROCEDURE — 2709999900 HC NON-CHARGEABLE SUPPLY: Performed by: ORTHOPAEDIC SURGERY

## 2022-02-02 PROCEDURE — 6360000002 HC RX W HCPCS: Performed by: NURSE ANESTHETIST, CERTIFIED REGISTERED

## 2022-02-02 PROCEDURE — 7100000011 HC PHASE II RECOVERY - ADDTL 15 MIN: Performed by: ORTHOPAEDIC SURGERY

## 2022-02-02 PROCEDURE — 3600000004 HC SURGERY LEVEL 4 BASE: Performed by: ORTHOPAEDIC SURGERY

## 2022-02-02 PROCEDURE — 3700000001 HC ADD 15 MINUTES (ANESTHESIA): Performed by: ORTHOPAEDIC SURGERY

## 2022-02-02 PROCEDURE — 7100000001 HC PACU RECOVERY - ADDTL 15 MIN: Performed by: ORTHOPAEDIC SURGERY

## 2022-02-02 PROCEDURE — 6360000002 HC RX W HCPCS: Performed by: ANESTHESIOLOGY

## 2022-02-02 PROCEDURE — 2580000003 HC RX 258: Performed by: NURSE ANESTHETIST, CERTIFIED REGISTERED

## 2022-02-02 PROCEDURE — 2500000003 HC RX 250 WO HCPCS: Performed by: NURSE ANESTHETIST, CERTIFIED REGISTERED

## 2022-02-02 PROCEDURE — A4217 STERILE WATER/SALINE, 500 ML: HCPCS | Performed by: ORTHOPAEDIC SURGERY

## 2022-02-02 PROCEDURE — 7100000000 HC PACU RECOVERY - FIRST 15 MIN: Performed by: ORTHOPAEDIC SURGERY

## 2022-02-02 PROCEDURE — 7100000010 HC PHASE II RECOVERY - FIRST 15 MIN: Performed by: ORTHOPAEDIC SURGERY

## 2022-02-02 PROCEDURE — 3700000000 HC ANESTHESIA ATTENDED CARE: Performed by: ORTHOPAEDIC SURGERY

## 2022-02-02 PROCEDURE — 64447 NJX AA&/STRD FEMORAL NRV IMG: CPT | Performed by: ANESTHESIOLOGY

## 2022-02-02 PROCEDURE — 6360000002 HC RX W HCPCS: Performed by: ORTHOPAEDIC SURGERY

## 2022-02-02 PROCEDURE — 88304 TISSUE EXAM BY PATHOLOGIST: CPT

## 2022-02-02 RX ORDER — FENTANYL CITRATE 50 UG/ML
25 INJECTION, SOLUTION INTRAMUSCULAR; INTRAVENOUS EVERY 5 MIN PRN
Status: DISCONTINUED | OUTPATIENT
Start: 2022-02-02 | End: 2022-02-02 | Stop reason: HOSPADM

## 2022-02-02 RX ORDER — SODIUM CHLORIDE 0.9 % (FLUSH) 0.9 %
5-40 SYRINGE (ML) INJECTION EVERY 12 HOURS SCHEDULED
Status: DISCONTINUED | OUTPATIENT
Start: 2022-02-02 | End: 2022-02-02 | Stop reason: HOSPADM

## 2022-02-02 RX ORDER — PROPOFOL 10 MG/ML
INJECTION, EMULSION INTRAVENOUS PRN
Status: DISCONTINUED | OUTPATIENT
Start: 2022-02-02 | End: 2022-02-02 | Stop reason: SDUPTHER

## 2022-02-02 RX ORDER — MIDAZOLAM HYDROCHLORIDE 1 MG/ML
INJECTION INTRAMUSCULAR; INTRAVENOUS PRN
Status: DISCONTINUED | OUTPATIENT
Start: 2022-02-02 | End: 2022-02-02 | Stop reason: SDUPTHER

## 2022-02-02 RX ORDER — SODIUM CHLORIDE 9 MG/ML
25 INJECTION, SOLUTION INTRAVENOUS PRN
Status: DISCONTINUED | OUTPATIENT
Start: 2022-02-02 | End: 2022-02-02 | Stop reason: HOSPADM

## 2022-02-02 RX ORDER — SODIUM CHLORIDE 0.9 % (FLUSH) 0.9 %
5-40 SYRINGE (ML) INJECTION PRN
Status: DISCONTINUED | OUTPATIENT
Start: 2022-02-02 | End: 2022-02-02 | Stop reason: HOSPADM

## 2022-02-02 RX ORDER — ROPIVACAINE HYDROCHLORIDE 5 MG/ML
INJECTION, SOLUTION EPIDURAL; INFILTRATION; PERINEURAL
Status: COMPLETED | OUTPATIENT
Start: 2022-02-02 | End: 2022-02-02

## 2022-02-02 RX ORDER — GLYCOPYRROLATE 0.2 MG/ML
INJECTION INTRAMUSCULAR; INTRAVENOUS PRN
Status: DISCONTINUED | OUTPATIENT
Start: 2022-02-02 | End: 2022-02-02 | Stop reason: SDUPTHER

## 2022-02-02 RX ORDER — ONDANSETRON 2 MG/ML
INJECTION INTRAMUSCULAR; INTRAVENOUS PRN
Status: DISCONTINUED | OUTPATIENT
Start: 2022-02-02 | End: 2022-02-02 | Stop reason: SDUPTHER

## 2022-02-02 RX ORDER — LABETALOL HYDROCHLORIDE 5 MG/ML
5 INJECTION, SOLUTION INTRAVENOUS EVERY 10 MIN PRN
Status: DISCONTINUED | OUTPATIENT
Start: 2022-02-02 | End: 2022-02-02 | Stop reason: HOSPADM

## 2022-02-02 RX ORDER — HYDROCODONE BITARTRATE AND ACETAMINOPHEN 5; 325 MG/1; MG/1
2 TABLET ORAL
Status: COMPLETED | OUTPATIENT
Start: 2022-02-02 | End: 2022-02-02

## 2022-02-02 RX ORDER — MAGNESIUM HYDROXIDE 1200 MG/15ML
LIQUID ORAL CONTINUOUS PRN
Status: COMPLETED | OUTPATIENT
Start: 2022-02-02 | End: 2022-02-02

## 2022-02-02 RX ORDER — PROMETHAZINE HYDROCHLORIDE 25 MG/ML
6.25 INJECTION, SOLUTION INTRAMUSCULAR; INTRAVENOUS
Status: DISCONTINUED | OUTPATIENT
Start: 2022-02-02 | End: 2022-02-02 | Stop reason: HOSPADM

## 2022-02-02 RX ORDER — SODIUM CHLORIDE 9 MG/ML
INJECTION, SOLUTION INTRAVENOUS CONTINUOUS PRN
Status: DISCONTINUED | OUTPATIENT
Start: 2022-02-02 | End: 2022-02-02 | Stop reason: SDUPTHER

## 2022-02-02 RX ORDER — DEXAMETHASONE SODIUM PHOSPHATE 4 MG/ML
INJECTION, SOLUTION INTRA-ARTICULAR; INTRALESIONAL; INTRAMUSCULAR; INTRAVENOUS; SOFT TISSUE PRN
Status: DISCONTINUED | OUTPATIENT
Start: 2022-02-02 | End: 2022-02-02 | Stop reason: SDUPTHER

## 2022-02-02 RX ORDER — FENTANYL CITRATE 50 UG/ML
INJECTION, SOLUTION INTRAMUSCULAR; INTRAVENOUS PRN
Status: DISCONTINUED | OUTPATIENT
Start: 2022-02-02 | End: 2022-02-02 | Stop reason: SDUPTHER

## 2022-02-02 RX ORDER — LIDOCAINE HYDROCHLORIDE 20 MG/ML
INJECTION, SOLUTION EPIDURAL; INFILTRATION; INTRACAUDAL; PERINEURAL PRN
Status: DISCONTINUED | OUTPATIENT
Start: 2022-02-02 | End: 2022-02-02 | Stop reason: SDUPTHER

## 2022-02-02 RX ORDER — HYDROCODONE BITARTRATE AND ACETAMINOPHEN 5; 325 MG/1; MG/1
1 TABLET ORAL
Status: DISCONTINUED | OUTPATIENT
Start: 2022-02-02 | End: 2022-02-02 | Stop reason: HOSPADM

## 2022-02-02 RX ADMIN — DEXAMETHASONE SODIUM PHOSPHATE 8 MG: 4 INJECTION, SOLUTION INTRA-ARTICULAR; INTRALESIONAL; INTRAMUSCULAR; INTRAVENOUS; SOFT TISSUE at 10:08

## 2022-02-02 RX ADMIN — LIDOCAINE HYDROCHLORIDE 100 MG: 20 INJECTION, SOLUTION EPIDURAL; INFILTRATION; INTRACAUDAL; PERINEURAL at 10:00

## 2022-02-02 RX ADMIN — ONDANSETRON 4 MG: 2 INJECTION INTRAMUSCULAR; INTRAVENOUS at 10:08

## 2022-02-02 RX ADMIN — MIDAZOLAM 2 MG: 1 INJECTION INTRAMUSCULAR; INTRAVENOUS at 09:28

## 2022-02-02 RX ADMIN — SODIUM CHLORIDE: 9 INJECTION, SOLUTION INTRAVENOUS at 10:25

## 2022-02-02 RX ADMIN — SODIUM CHLORIDE: 9 INJECTION, SOLUTION INTRAVENOUS at 09:10

## 2022-02-02 RX ADMIN — GLYCOPYRROLATE 0.2 MG: 0.2 INJECTION, SOLUTION INTRAMUSCULAR; INTRAVENOUS at 09:54

## 2022-02-02 RX ADMIN — FENTANYL CITRATE 50 MCG: 50 INJECTION INTRAMUSCULAR; INTRAVENOUS at 09:28

## 2022-02-02 RX ADMIN — ROPIVACAINE HYDROCHLORIDE 25 ML: 5 INJECTION, SOLUTION EPIDURAL; INFILTRATION; PERINEURAL at 09:39

## 2022-02-02 RX ADMIN — PROPOFOL 200 MG: 10 INJECTION, EMULSION INTRAVENOUS at 10:00

## 2022-02-02 RX ADMIN — CEFAZOLIN 2 G: 10 INJECTION, POWDER, FOR SOLUTION INTRAVENOUS at 09:54

## 2022-02-02 RX ADMIN — FENTANYL CITRATE 25 MCG: 50 INJECTION INTRAMUSCULAR; INTRAVENOUS at 10:08

## 2022-02-02 RX ADMIN — FENTANYL CITRATE 25 MCG: 50 INJECTION INTRAMUSCULAR; INTRAVENOUS at 10:00

## 2022-02-02 RX ADMIN — HYDROCODONE BITARTRATE AND ACETAMINOPHEN 2 TABLET: 5; 325 TABLET ORAL at 12:06

## 2022-02-02 ASSESSMENT — PAIN DESCRIPTION - LOCATION
LOCATION: KNEE

## 2022-02-02 ASSESSMENT — PULMONARY FUNCTION TESTS
PIF_VALUE: 11
PIF_VALUE: 12
PIF_VALUE: 10
PIF_VALUE: 11
PIF_VALUE: 10
PIF_VALUE: 10
PIF_VALUE: 11
PIF_VALUE: 12
PIF_VALUE: 12
PIF_VALUE: 10
PIF_VALUE: 12
PIF_VALUE: 13
PIF_VALUE: 13
PIF_VALUE: 0
PIF_VALUE: 12
PIF_VALUE: 12
PIF_VALUE: 10
PIF_VALUE: 11
PIF_VALUE: 1
PIF_VALUE: 11
PIF_VALUE: 12
PIF_VALUE: 0
PIF_VALUE: 5
PIF_VALUE: 12
PIF_VALUE: 11
PIF_VALUE: 10
PIF_VALUE: 6
PIF_VALUE: 12
PIF_VALUE: 11
PIF_VALUE: 5
PIF_VALUE: 10
PIF_VALUE: 5
PIF_VALUE: 11
PIF_VALUE: 12
PIF_VALUE: 9
PIF_VALUE: 12
PIF_VALUE: 5
PIF_VALUE: 12
PIF_VALUE: 12
PIF_VALUE: 11
PIF_VALUE: 0
PIF_VALUE: 12
PIF_VALUE: 5
PIF_VALUE: 12
PIF_VALUE: 12
PIF_VALUE: 10
PIF_VALUE: 1
PIF_VALUE: 10
PIF_VALUE: 12
PIF_VALUE: 12
PIF_VALUE: 10
PIF_VALUE: 11
PIF_VALUE: 12

## 2022-02-02 ASSESSMENT — PAIN DESCRIPTION - DESCRIPTORS
DESCRIPTORS: ACHING

## 2022-02-02 ASSESSMENT — PAIN DESCRIPTION - PROGRESSION
CLINICAL_PROGRESSION: GRADUALLY WORSENING
CLINICAL_PROGRESSION: GRADUALLY WORSENING
CLINICAL_PROGRESSION: NOT CHANGED
CLINICAL_PROGRESSION: RAPIDLY IMPROVING

## 2022-02-02 ASSESSMENT — PAIN DESCRIPTION - ONSET
ONSET: ON-GOING

## 2022-02-02 ASSESSMENT — PAIN DESCRIPTION - ORIENTATION
ORIENTATION: RIGHT

## 2022-02-02 ASSESSMENT — PAIN DESCRIPTION - FREQUENCY
FREQUENCY: CONTINUOUS

## 2022-02-02 ASSESSMENT — PAIN - FUNCTIONAL ASSESSMENT
PAIN_FUNCTIONAL_ASSESSMENT: PREVENTS OR INTERFERES SOME ACTIVE ACTIVITIES AND ADLS
PAIN_FUNCTIONAL_ASSESSMENT: 0-10
PAIN_FUNCTIONAL_ASSESSMENT: PREVENTS OR INTERFERES SOME ACTIVE ACTIVITIES AND ADLS
PAIN_FUNCTIONAL_ASSESSMENT: PREVENTS OR INTERFERES SOME ACTIVE ACTIVITIES AND ADLS

## 2022-02-02 ASSESSMENT — PAIN DESCRIPTION - PAIN TYPE
TYPE: SURGICAL PAIN

## 2022-02-02 ASSESSMENT — PAIN SCALES - GENERAL
PAINLEVEL_OUTOF10: 5
PAINLEVEL_OUTOF10: 6
PAINLEVEL_OUTOF10: 1
PAINLEVEL_OUTOF10: 8

## 2022-02-02 NOTE — ANESTHESIA POSTPROCEDURE EVALUATION
[facility] Department of Anesthesiology  Post-Anesthesia Note       Name:  Abe Keating                                  Age:  49 y.o. MRN:  [id number]     Last Vitals & Oxygen Saturation: BP (!) 155/97   Pulse 52   Temp 97 °F (36.1 °C) (Temporal)   Resp 16   Ht 5' 1\" (1.549 m)   Wt 151 lb 12.6 oz (68.9 kg)   SpO2 99%   BMI 28.68 kg/m²   Patient Vitals for the past 4 hrs:   BP Temp Temp src Pulse Resp SpO2 Height Weight   02/02/22 1134 (!) 155/97 97 °F (36.1 °C) Temporal 52 16 99 %     02/02/22 1117 (!) 160/80   62 10 100 %     02/02/22 1112 (!) 157/87   63 16 100 %     02/02/22 1107 (!) 159/93   66 19 100 %     02/02/22 1100 (!) 163/81 97.7 °F (36.5 °C) Temporal 70 12 100 %     02/02/22 0841 (!) 145/81 97.3 °F (36.3 °C) Temporal 70 16 98 % 5' 1\" (1.549 m) 151 lb 12.6 oz (68.9 kg)       Level of consciousness:  Awake, alert    Respiratory: Respirations easy, no distress. Stable. Cardiovascular: Hemodynamically stable. Hydration: Adequate. PONV: Adequately managed. Post-op pain: Adequately controlled. Post-op assessment: Tolerated anesthetic well without complication. Complications:  None. Nerve block intact. Moving foot. No issues.     Marguerite Briones MD  February 2, 2022   12:07 PM

## 2022-02-02 NOTE — PROGRESS NOTES
Pt arrived to Phase 2 from the PACU alert and oriented. States pain is 6/10. VSS. Family at bedside. Tolerating oral intake. Dressing dry and intact. Will continue to monitor.

## 2022-02-02 NOTE — OP NOTE
[address]                                OPERATIVE REPORT    PATIENT NAME: Lord Bullock                       :        1970  MED REC NO:   [id number]                          ROOM:  ACCOUNT NO:   [de-identified]                           ADMIT DATE: 2022  PROVIDER:     Darlene Sheehan MD      DATE OF PROCEDURE:  2022    PREOPERATIVE DIAGNOSIS:  Right knee prepatellar bursitis, chronic and  large. POSTOPERATIVE DIAGNOSIS:  Right knee prepatellar bursitis, chronic and  large. OPERATION PERFORMED:  Open excision of right knee prepatellar bursa. SURGEON:  Darlene Sheehan MD    FIRST ASSISTANT:  Dr. Paige Bernal. ANESTHESIA:  General with femoral nerve block. ANTIBIOTICS:  Ancef. ESTIMATED BLOOD LOSS:  Minimal.    COMPLICATIONS:  None apparent. TOURNIQUET TIME:  Just over 30 minutes. INDICATIONS:  The patient has had more than five years of symptomatic  prepatellar bursitis of the right knee. It is quite large, measuring in  excess of 6 cm in all dimensions and she was indicated for surgery. Understanding the risks, benefits, and alternatives of the operation,  she was eager to proceed. OPERATIVE PROCEDURE:  The patient was identified in the preop holding  area. Informed consent had been obtained. The operative site of the  right knee was marked by me with my initials. She was given a preop  femoral nerve block, brought to the operating room and placed under  general anesthesia. The right lower extremity was prepped and draped in  standard sterile fashion with alcohol and ChloraPrep and time-out  confirmed appropriate patient, positioning, operative site, and  availability of instrumentation. Tourniquet was inflated to 250 mmHg after exsanguinating with an Esmarch  bandage.   An open incision was made from the superior aspect to the  inferior aspect of the bursa and it was carefully resected en bloc,  removing only the bursa without fracturing or puncturing  the bursa sac. Deep tissue was then irrigated. Because the soft tissue  had expanded so much, we needed to excise approximately 2 cm of skin  from each side of the incision and we did that in an elliptical fashion. We again then thoroughly irrigated the wound and closed it in layers  with 0 Vicryl, then 2-0 Vicryl and skin with Monocryl. Prineo dressing  and sterile dressing were applied. The patient was then awoken and  transported to recovery without complication after placing a knee  immobilizer. The bursa was sent for specimen to Pathology.         Rocio Shane MD    D: 02/02/2022 10:50:49       T: 02/02/2022 13:07:35     [email or web address]  Job#: [id number]     Doc#: [id number]    CC:

## 2022-02-02 NOTE — PROGRESS NOTES
Pt asleep. Wakes easily. States pain 5/10 - pt willing to go to phase 2 care and take po pain medication. To phase 2.

## 2022-02-02 NOTE — ANESTHESIA PRE PROCEDURE
[facility] Department of Anesthesiology  Pre-Anesthesia Evaluation/Consultation       Name:  Rocco Raygoza  : 1970  Age:  49 y.o. MRN:  [id number]  Date: 2022           Surgeon: Surgeon(s):  Kim Miller MD    Procedure: Procedure(s):  RIGHT KNEE OPEN EXCISION PREPATELLAR BURSA     No Known Allergies  Patient Active Problem List   Diagnosis    Numbness and tingling in both hands    Swollen R knee     Past Medical History:   Diagnosis Date    COVID-19-positive     2021    Wears glasses      Past Surgical History:   Procedure Laterality Date     SECTION       Social History     Tobacco Use    Smoking status: Current Every Day Smoker     Packs/day: 0.25     Years: 10.00     Pack years: 2.50     Types: Cigarettes    Smokeless tobacco: Never Used   Vaping Use    Vaping Use: Former    Substances: Nicotine, Flavoring    Devices: Disposable   Substance Use Topics    Alcohol use: No    Drug use: Yes     Types: Marijuana (Weed)     Comment: seldom     Medications  No current facility-administered medications on file prior to encounter.      Current Outpatient Medications on File Prior to Encounter   Medication Sig Dispense Refill    acetaminophen (TYLENOL) 325 MG tablet Take 650 mg by mouth every 6 hours as needed for Pain       Current Facility-Administered Medications   Medication Dose Route Frequency Provider Last Rate Last Admin    ceFAZolin (ANCEF) 2000 mg in dextrose 5 % 100 mL IVPB  2,000 mg IntraVENous Once Kim Miller MD        sodium chloride flush 0.9 % injection 5-40 mL  5-40 mL IntraVENous 2 times per day Lisa Sidhu MD        sodium chloride flush 0.9 % injection 5-40 mL  5-40 mL IntraVENous PRN Lisa Sidhu MD        0.9 % sodium chloride infusion  25 mL IntraVENous PRN Lisa Sidhu MD         Vital Signs (Current)   Vitals:    22 0841   BP: (!) 145/81   Pulse: 70   Resp: 16   Temp: 97.3 °F (36.3 °C)   SpO2: 98% Vital Signs Statistics (for past 48 hrs)     Temp  Av.3 °F (36.3 °C)  Min: 97.3 °F (36.3 °C)   Min taken time: 22  Max: 97.3 °F (36.3 °C)   Max taken time: 22  Pulse  Av  Min: 79   Min taken time: 22  Max: 79   Max taken time: 22  Resp  Av  Min: 12   Min taken time: 22  Max: 12   Max taken time: 22  BP  Min: 145/81   Min taken time: 22  Max: 145/81   Max taken time: 22  SpO2  Av %  Min: 98 %   Min taken time: 22  Max: 98 %   Max taken time: 22    BP Readings from Last 3 Encounters:   22 (!) 145/81   22 124/70   22 122/78     BMI  Body mass index is 28.68 kg/m². Estimated body mass index is 28.68 kg/m² as calculated from the following:    Height as of this encounter: 5' 1\" (1.549 m). Weight as of this encounter: 151 lb 12.6 oz (68.9 kg). CBC   Lab Results   Component Value Date    WBC 5.5 2022    RBC 4.82 2022    HGB 13.8 2022    HCT 42.7 2022    MCV 88.5 2022    RDW 15.2 2022     2022     CMP    Lab Results   Component Value Date     2022    K 4.1 2022    K 4.0 2021     2022    CO2 26 2022    BUN 9 2022    CREATININE 0.8 2022    GFRAA >60 2022    AGRATIO 1.7 2022    LABGLOM >60 2022    GLUCOSE 106 2022    PROT 7.4 2022    CALCIUM 9.9 2022    BILITOT 0.5 2022    ALKPHOS 68 2022    AST 13 2022    ALT 8 2022     BMP    Lab Results   Component Value Date     2022    K 4.1 2022    K 4.0 2021     2022    CO2 26 2022    BUN 9 2022    CREATININE 0.8 2022    CALCIUM 9.9 2022    GFRAA >60 2022    LABGLOM >60 2022    GLUCOSE 106 2022     POCGlucose  No results for input(s): GLUCOSE in the last 72 hours.    Coags  No results found for: AM

## 2022-02-02 NOTE — ANESTHESIA PROCEDURE NOTES
Peripheral Block    Patient location during procedure: pre-op  Start time: 2/2/2022 9:29 AM  End time: 2/2/2022 9:32 AM  Staffing  Performed: anesthesiologist   Anesthesiologist: Michelle Singh MD  Preanesthetic Checklist  Completed: patient identified, IV checked, site marked, risks and benefits discussed, surgical consent, monitors and equipment checked, pre-op evaluation, timeout performed, anesthesia consent given, oxygen available and patient being monitored  Peripheral Block  Patient position: sitting  Prep: DuraPrep  Patient monitoring: continuous pulse ox and IV access  Block type: Femoral  Laterality: right  Injection technique: single-shot  Guidance: nerve stimulator and ultrasound guided  Femoral crease  Provider prep: sterile gloves and mask  Needle  Needle type: combined needle/nerve stimulator   Needle gauge: 20 G  Needle length: 5 cm  Needle localization: nerve stimulator and ultrasound guidance  Needle insertion depth: 4 cm  Assessment  Injection assessment: negative aspiration for heme, no paresthesia on injection and local visualized surrounding nerve on ultrasound  Paresthesia pain: none  Slow fractionated injection: yes  Hemodynamics: stable  Medications Administered  Ropivacaine (NAROPIN) injection 0.5%, 25 mL  Reason for block: post-op pain management and at surgeon's request

## 2022-02-02 NOTE — PROGRESS NOTES
To pacu from OR. Pt awake, states pain 5/10 and tolerable. Dressing to right knee dry and intact. Ice to right knee. Immobilizer on right knee. Pedal pulses palpable. IV infusing. Monitor in sinus rhythm.

## 2022-02-02 NOTE — DISCHARGE INSTR - DIET
Good nutrition is important when healing from an illness, injury, or surgery. Follow any nutrition recommendations given to you during your hospital stay. If you were given an oral nutrition supplement while in the hospital, continue to take this supplement at home. You can take it with meals, in-between meals, and/or before bedtime. These supplements can be purchased at most local grocery stores, pharmacies, and chain [employer]-stores. If you have any questions about your diet or nutrition, call the hospital and ask for the dietitian. Advance to regular diet as tolerated.

## 2022-02-02 NOTE — PROGRESS NOTES
Discharge instructions given to pt and friend, verbalized understanding. VSS. Dressing dry and intact. States pain is 1/10. IV dc's. Up and getting dressed. Ready for discharge.

## 2022-02-02 NOTE — BRIEF OP NOTE
Brief Postoperative Note      Patient: Juliana Nnun  YOB: 1970  MRN: [id number]    Date of Procedure: 2/2/2022    Pre-Op Diagnosis: RIGHT KNEE PREPATELLAR BURSITIS    Post-Op Diagnosis: Same       Procedure(s):  RIGHT KNEE OPEN EXCISION PREPATELLAR BURSA    Surgeon(s):  Zack Jeronimo MD    Assistant:  Surgical Assistant: Tye Daniel    Anesthesia: General    Estimated Blood Loss (mL): Minimal    Complications: None    Specimens:   ID Type Source Tests Collected by Time Destination   A : A-Right knee bursa Tissue Tissue SURGICAL PATHOLOGY Zack Jeronimo MD 2/2/2022 1020        Implants:  * No implants in log *      Drains: * No LDAs found *    Findings: large prepatella bursa    Electronically signed by Pepito Cuevas MD on 2/2/2022 at 10:28 AM

## 2022-02-04 ENCOUNTER — TELEPHONE (OUTPATIENT)
Dept: ORTHOPEDIC SURGERY | Age: 52
End: 2022-02-04

## 2022-02-04 NOTE — TELEPHONE ENCOUNTER
Surgery and/or Procedure Scheduling     Contact Name: Precious English Request: sx on rt knee on 02/02/22  Patient Contact Number: [phone]    Patient stated she is having some sereve spasm in her knee and the medication is not working.  Please Advise

## 2022-02-05 ENCOUNTER — OFFICE VISIT (OUTPATIENT)
Dept: ORTHOPEDIC SURGERY | Age: 52
End: 2022-02-05

## 2022-02-05 DIAGNOSIS — M25.561 CHRONIC PAIN OF RIGHT KNEE: Primary | ICD-10-CM

## 2022-02-05 DIAGNOSIS — M70.41 PREPATELLAR BURSITIS, RIGHT KNEE: ICD-10-CM

## 2022-02-05 DIAGNOSIS — G89.29 CHRONIC PAIN OF RIGHT KNEE: Primary | ICD-10-CM

## 2022-02-05 PROCEDURE — 99024 POSTOP FOLLOW-UP VISIT: CPT | Performed by: ORTHOPAEDIC SURGERY

## 2022-02-05 NOTE — PROGRESS NOTES
Returns today 3 days status post right knee open prepatellar bursectomy. Pathology came back as prepatellar bursa. She is doing fine. Change her bandages and placed new dressings and compression wrap. I showed her photos. Neurologic and vascular exams right lower extremity are normal and calf is soft.  She will continue with her knee immobilizer and I will see her at the end of the week for suture removal.

## 2022-02-10 ENCOUNTER — OFFICE VISIT (OUTPATIENT)
Dept: ORTHOPEDIC SURGERY | Age: 52
End: 2022-02-10

## 2022-02-10 ENCOUNTER — TELEPHONE (OUTPATIENT)
Dept: ORTHOPEDIC SURGERY | Age: 52
End: 2022-02-10

## 2022-02-10 VITALS — WEIGHT: 151 LBS | HEIGHT: 61 IN | BODY MASS INDEX: 28.51 KG/M2

## 2022-02-10 DIAGNOSIS — G89.29 CHRONIC PAIN OF RIGHT KNEE: Primary | ICD-10-CM

## 2022-02-10 DIAGNOSIS — M25.561 CHRONIC PAIN OF RIGHT KNEE: Primary | ICD-10-CM

## 2022-02-10 DIAGNOSIS — M70.41 PREPATELLAR BURSITIS, RIGHT KNEE: ICD-10-CM

## 2022-02-10 PROCEDURE — 99024 POSTOP FOLLOW-UP VISIT: CPT | Performed by: ORTHOPAEDIC SURGERY

## 2022-02-10 RX ORDER — IBUPROFEN 200 MG
200 TABLET ORAL EVERY 6 HOURS PRN
COMMUNITY
End: 2022-02-24

## 2022-02-10 NOTE — TELEPHONE ENCOUNTER
Other POST OP PROBLEM    PATIENT EXPERIENCING MUSCLE SPASMS IN R KNEE. REQUESTING A CALL TO DISCUSS WAYS TO STOP THEM. PLEASE ADVISE.  SAVANNAH WAS 2/2  CONTACT: [phone]

## 2022-02-10 NOTE — PROGRESS NOTES
Ana Nicolas returns today status post right knee open excision of a large prepatellar bursitis. She is doing fine. Pain is appropriately managed. Today, incision with good without infection the removed her bandages and Prineo dressing. We cleansed the area and placed new Tegaderm. She will continue with a knee immobilizer and Tegaderm for 2 more weeks and I will see her back at that time to initiate range of motion.

## 2022-02-24 ENCOUNTER — OFFICE VISIT (OUTPATIENT)
Dept: ORTHOPEDIC SURGERY | Age: 52
End: 2022-02-24

## 2022-02-24 VITALS — RESPIRATION RATE: 12 BRPM | HEIGHT: 61 IN | WEIGHT: 151 LBS | BODY MASS INDEX: 28.51 KG/M2

## 2022-02-24 DIAGNOSIS — G89.29 CHRONIC PAIN OF RIGHT KNEE: Primary | ICD-10-CM

## 2022-02-24 DIAGNOSIS — M70.41 PREPATELLAR BURSITIS, RIGHT KNEE: ICD-10-CM

## 2022-02-24 DIAGNOSIS — M25.561 CHRONIC PAIN OF RIGHT KNEE: Primary | ICD-10-CM

## 2022-02-24 PROCEDURE — 99024 POSTOP FOLLOW-UP VISIT: CPT | Performed by: ORTHOPAEDIC SURGERY

## 2022-02-24 NOTE — PROGRESS NOTES
Brendan Hopkins returns today status post right knee open prepatellar bursal excision. She is doing well and reports no pain. Today, incision is healing nicely. Bit of tenderness at the inferior pole. Calf is soft. She can discontinue her knee immobilizer. She can bear weight to tolerance on crutches. We will start therapy and emphasis on range of motion and strength. Follow-up with me in a month.

## 2022-03-03 ENCOUNTER — HOSPITAL ENCOUNTER (OUTPATIENT)
Dept: PHYSICAL THERAPY | Age: 52
Setting detail: THERAPIES SERIES
Discharge: HOME | End: 2022-03-03
Payer: COMMERCIAL

## 2022-03-03 PROCEDURE — 97112 NEUROMUSCULAR REEDUCATION: CPT | Performed by: PHYSICAL THERAPIST

## 2022-03-03 PROCEDURE — 97110 THERAPEUTIC EXERCISES: CPT | Performed by: PHYSICAL THERAPIST

## 2022-03-03 PROCEDURE — 97161 PT EVAL LOW COMPLEX 20 MIN: CPT | Performed by: PHYSICAL THERAPIST

## 2022-03-03 PROCEDURE — 97116 GAIT TRAINING THERAPY: CPT | Performed by: PHYSICAL THERAPIST

## 2022-03-03 PROCEDURE — 97016 VASOPNEUMATIC DEVICE THERAPY: CPT | Performed by: PHYSICAL THERAPIST

## 2022-03-03 NOTE — FLOWSHEET NOTE
[facility], [address] [place], [address]  Phone: [phone]   Fax: [phone]    Physical Therapy Treatment Note/ Progress Report:       Date:  3/3/2022    Patient Name:  Skye Rivas    :  1970  MRN: [id number]  Restrictions/Precautions:    Medical/Treatment Diagnosis Information:  Diagnosis: S/p open incision R Knee Prepatellar bursectomy- M25.561, G89.29, M70.41- 22          Treatment Diagnosis: Decreased functional mobility, AROM, strength, ADL status, high-level IADLs, endurance, Increased Pain  Insurance/Certification information:  PT Insurance Information: [facility]  Physician Information:  Referring Practitioner: Dr. Mauricio Corea  Has the plan of care been signed (Y/N):        []  Yes  [x]  No     Date of Patient follow up with Physician: 3/24/22      Is this a Progress Report:     []  Yes  [x]  No        Progress report/Recertification will be due (10 Rx or 30 days whichever is less): 3/31/22        Visit # Insurance Allowable Auth Required   1 30 []  Yes []  No        Functional Scale:    Date assessed:  3/3/22   WOMAC- 59% Disability    Latex Allergy:  [x]NO      []YES  Preferred Language for Healthcare:   [x]English       []other:      Pain level:  5/10 at best, 10/10 at wrost 3/3    SUBJECTIVE:  See eval 3/3    OBJECTIVE: See eval 3/3   Observation:    Test measurements:      RESTRICTIONS/PRECAUTIONS:     Exercises/Interventions: 3/3      Exercise/Equipment Resistance/Repetitions Other comments   Stretching     Hamstring  Fwd bend 5 x 30\" Added 3/3   Towel Pull 5 x 30' Added 3/3   Inclined Calf     Hip Flexion     ITB     Groin     Quad                    SLR     Supine     Abduction     Adduction     Prone     SLR+          Isometrics     Quad sets 3 x 20 Added 3/3        Patellar Glides     Medial     Superior     Inferior          ROM     Sheet Pulls     Hang Weights     Passive     Active     Weight Shift Ankle Pumps     Heel Slides  10 x 10 sec Added 3/3             CKC     Calf raises     Wall sits     Step ups     1 leg stand     Squatting     CC TKE     Balance     bridges          Gait training  35' x 4 laps Added 3/3             PRE     Extension  RANGE:   Flexion  RANGE:        Quantum machines     Leg press      Leg extension     Leg curl          Manual interventions                     Therapeutic Exercise and NMR EXR  [x] (54652) Provided verbal/tactile cueing for activities related to strengthening, flexibility, endurance, ROM for improvements in LE, proximal hip, and core control with self care, mobility, lifting, ambulation. [x] (82164) Provided verbal/tactile cueing for activities related to improving balance, coordination, kinesthetic sense, posture, motor skill, proprioception  to assist with LE, proximal hip, and core control in self care, mobility, lifting, ambulation and eccentric single leg control. NMR and Therapeutic Activities:    [x] (90880 or 48399) Provided verbal/tactile cueing for activities related to improving balance, coordination, kinesthetic sense, posture, motor skill, proprioception and motor activation to allow for proper function of core, proximal hip and LE with self care and ADLs  [x] (39626) Gait Re-education- Provided training and instruction to the patient for proper LE, core and proximal hip recruitment and positioning and eccentric body weight control with ambulation re-education including up and down stairs     Pt educated to properly ambulate with one crutch.  3/3    Home Exercise Program:    [x] (58250) Reviewed/Progressed HEP activities related to strengthening, flexibility, endurance, ROM of core, proximal hip and LE for functional self-care, mobility, lifting and ambulation/stair navigation   [x] (05043)Reviewed/Progressed HEP activities related to improving balance, coordination, kinesthetic sense, posture, motor skill, proprioception of core, proximal hip prior level of function. []? Progressing: []? Met: []? Not Met: []? Adjusted  2. Patient will demonstrate increased R knee AROM to 125 degrees to allow for proper joint functioning as indicated by patients Functional Deficits. []? Progressing: []? Met: []? Not Met: []? Adjusted  3. Patient will demonstrate an increase in R knee (Ext and flex) strength to 4/5 and good proximal hip strength (flex, ext, ABD) to 4/5 to allow for proper functional mobility as indicated by patients Functional Deficits. []? Progressing: []? Met: []? Not Met: []? Adjusted  4. Patient will return to home management functional activities without increased symptoms or restriction. []? Progressing: []? Met: []? Not Met: []? Adjusted  5. Patient will return to walking outside without increased pain or symptoms    []? Progressing: []? Met: []? Not Met: []? Adjusted            Overall Progression Towards Functional goals/ Treatment Progress Update:  [] Patient is progressing as expected towards functional goals listed. [] Progression is slowed due to complexities/Impairments listed. [] Progression has been slowed due to co-morbidities. [x] Plan just implemented, too soon to assess goals progression <30days   [] Goals require adjustment due to lack of progress  [] Patient is not progressing as expected and requires additional follow up with physician  [] Other    Prognosis for POC: [x] Good [] Fair  [] Poor      Patient requires continued skilled intervention: [x] Yes  [] No    Treatment/Activity Tolerance:  [x] Patient tolerated treatment well [] Patient limited by fatique  [] Patient limited by pain  [] Patient limited by other medical complications  [] Other:      Patient education: Patient education on PT and plan of care including diagnosis, prognosis, treatment goals and options. Patient agrees with discussed POC and treatment and is aware of rehab process.  3/2    PLAN: See eval 3/2  [] Continue per plan of care [] Alter current plan (see comments above)  [x] Plan of care initiated [] Hold pending MD visit [] Discharge      Electronically signed by: Grabiel Hoskins, student physical therapist  Therapist was present, directed the patient's care, made skilled judgement, and was responsible for assessment and treatment of the patient. Patient was in agreement to be treated by student physical therapist with licensed physical therapist present. Rayna Sargent, PT, DPT     Note: If patient does not return for scheduled/ recommended follow up visits, this note will serve as a discharge from care along with most recent update on progress.

## 2022-03-03 NOTE — PLAN OF CARE
[address] [facility],[address], [address] [facility], [address]  Phone: [phone]   Fax: [phone]                                                       Physical Therapy Certification    Dear Referring Practitioner: Dr. Consuelo Grey,    We had the pleasure of evaluating the following patient for physical therapy services at [address] [place] [address]. A summary of our findings can be found in the initial assessment below. This includes our plan of care. If you have any questions or concerns regarding these findings, please do not hesitate to contact me at the office phone number checked above. Thank you for the referral.       Physician Signature:_______________________________Date:__________________  By signing above (or electronic signature), therapists plan is approved by physician      Patient: [place]   : 1970   MRN: [id number]  Referring Physician: Referring Practitioner: Dr. Consuelo Grey      Evaluation Date: 3/3/2022      Medical Diagnosis Information:  Diagnosis: S/p open incision R Knee Prepatellar bursectomy- M25.561, G89.29, M70.41- 22   Treatment Diagnosis: Decreased functional mobility, AROM, strength, ADL status, high-level IADLs, endurance, Increased Pain                                         Insurance information: [facility]     C-SSRS Triggered by Intake questionnaire (Past 2 wk assessment):   [x] No, Questionnaire did not trigger screening.   [] Yes, Patient intake triggered further evaluation      [] C-SSRS Screening completed  [] PCP notified via Plan of Care  [] Emergency services notified     Precautions/ Contra-indications:   Latex Allergy:  [x]NO      []YES  Preferred Language for Healthcare:   [x]English       []other:    SUBJECTIVE: Patient stated complaint: In 2022 patient was walking up a couple flights of stairs and felt that the right leg \"broke down\".  This issue has been an ongoing problem for her since 2017 in which the patient thought that there was water surrounding the knee joint and went to [address] [place] [address]. It ended up being bursitis and she did not do anything about it until her January 2022 incident. She went to another MD in January, who recommended surgery. She presents to PT one month post-op of open excision or R knee prepatellar bursa. She was put an immobilizer for 4 weeks and was given clearance by MD last week at her follow-up to start WB and discontinue the immobilizer. She has started putting more pressure on the operative leg. In the house she does not use crutches. When in public she has been using one crutch on the R side. She has not performed any exercises around the house. Having the brace off helps with sleeping on side. Muscle spasms in the calf and pain in the knee does wake the patient up at night. She hasn't performed any standing for long periods of time. She has started showering but still requires help getting into the shower. Patient does have 7 steps leading in from the garage to the house.       Relevant Medical History: Vision (wears glasses)  Functional Disability Index:   WOMAC  3/3 - 59% Disability    Pain Scale: 5/10 at best, 10/10 at worst   Easing factors: Medication, ice   Provocative factors: Patient can be sitting in recliner and still get the pain     Type: []Constant   []Intermittent  []Radiating []Localized [x]other: Sharp pains in the front of the knee     Numbness/Tingling: Numbness into Tibia and foot    Occupation/School: Unemployed     Hobbies: Walking     Living Status/Prior Level of Function: Independent with ADLs and IADLs    OBJECTIVE:  3/3    Flexibility L R Comment   Hamstring Deferred to recent surgery  Deferred due to recent surgery    Gastroc Deferred due to recent surgery Deferred due to recent surgery    ITB Deferred due to recent surgery Deferred due to recent surgery    Quad Deferred due to recent surgery Deferred due to recent surgery            ROM PROM AROM Comment    L R L R    Flexion  72 degrees  125 degrees    Extension   0 degrees 3 degrees of hyper extension                        Strength L R Comment   Quad Deferred due to recent surgery  Deferred due to recent surgery    Hamstring Deferred due to recent surgery Deferred due to recent surgery    Gastroc Deferred due to recent surgery Deferred due to recent surgery    Hip  flexion Deferred due to recent surgery Deferred due to recent surgery    Hip abd Deferred due to recent surgery Deferred due to recent surgery                  Reflexes/Sensation: N/A   []Dermatomes/Myotomes intact    []Reflexes equal and normal bilaterally   []Other:    Joint mobility: Deferred   []Normal    []Hypo   []Hyper    Palpation: TTP in the calf    Functional Mobility/Transfers: Independent     Posture: Seated rounded shoulders    Bandages/Dressings/Incisions: Healing    Gait: (include devices/WB status) WBAT- Step through gait pattern using crutch- Antalgic gait    Special Tests: Homans- Neg                         [x] Patient history, allergies, meds reviewed. Medical chart reviewed. See intake form. Review Of Systems (ROS):  [x]Performed Review of systems (Integumentary, CardioPulmonary, Neurological) by intake and observation. Intake form has been scanned into medical record. Patient has been instructed to contact their primary care physician regarding ROS issues if not already being addressed at this time.       Co-morbidities/Complexities (which will affect course of rehabilitation):  []None           Arthritic conditions   []Rheumatoid arthritis (M05.9)  []Osteoarthritis (M19.91)   Cardiovascular conditions   []Hypertension (I10)  []Hyperlipidemia (E78.5)  []Angina pectoris (I20)  []Atherosclerosis (I70)   Musculoskeletal conditions   []Disc pathology   []Congenital spine pathologies   []Prior surgical intervention  []Osteoporosis (M81.8)  []Osteopenia (M85.8)   Endocrine conditions   []Hypothyroid (E03.9)  []Hyperthyroid Gastrointestinal conditions   []Constipation (F47.08)   Metabolic conditions   []Morbid obesity (E66.01)  []Diabetes type 1(E10.65) or 2 (E11.65)   []Neuropathy (G60.9)     Pulmonary conditions   []Asthma (J45)  []Coughing   []COPD (J44.9)   Psychological Disorders  []Anxiety (F41.9)  []Depression (F32.9)   []Other:   [x]Other:     Hx of smoking     Barriers to/and or personal factors that will affect rehab potential:              [x]Age  []Sex              []Motivation/Lack of Motivation                        []Co-Morbidities              []Cognitive Function, education/learning barriers              []Environmental, home barriers              []profession/work barriers  []past PT/medical experience  [x]other:  Justification: Patient has a Hx of smoking that can slow healing process. Falls Risk Assessment (30 days):   [x] Falls Risk assessed and no intervention required.   [] Falls Risk assessed and Patient requires intervention due to being higher risk   TUG score (>12s at risk):     [] Falls education provided, including         ASSESSMENT: 3/3  Functional Impairments:     []Noted lumbar/proximal hip/LE hypomobility   [x]Decreased LE functional ROM   [x]Decreased core/proximal hip strength and neuromuscular control   [x]Decreased LE functional strength   [x]Reduced balance/proprioceptive control   []other:      Functional Activity Limitations (from functional questionnaire and intake)   [x]Reduced ability to tolerate prolonged functional positions   [x]Reduced ability or difficulty with changes of positions or transfers between positions   [x]Reduced ability to maintain good posture and demonstrate good body mechanics with sitting, bending, and lifting   [x]Reduced ability to sleep   [x] Reduced ability or tolerance with driving and/or computer work   [x]Reduced ability to perform lifting, carrying tasks   [x]Reduced ability to squat   []Reduced ability to forward bend   [x]Reduced ability to ambulate prolonged functional periods/distances/surfaces   [x]Reduced ability to ascend/descend stairs   []Reduced ability to run, hop or jump   []other:     Participation Restrictions   [x]Reduced participation in self care activities   [x]Reduced participation in home management activities   []Reduced participation in work activities   []Reduced participation in social activities. []Reduced participation in sport activities. Classification :    [x]Signs/symptoms consistent with post-surgical status including decreased ROM, strength and function.    []Signs/symptoms consistent with joint sprain/strain   []Signs/symptoms consistent with patella-femoral syndrome   []Signs/symptoms consistent with knee OA/hip OA   []Signs/symptoms consistent with internal derangement of knee/Hip   []Signs/symptoms consistent with functional hip weakness/NMR control      []Signs/symptoms consistent with tendinitis/tendinosis    []signs/symptoms consistent with pathology which may benefit from Dry needling      []other:      Prognosis/Rehab Potential:      []Excellent   [x]Good    []Fair   []Poor    Tolerance of evaluation/treatment:    []Excellent   [x]Good    []Fair   []Poor    Physical Therapy Evaluation Complexity Justification  [x] A history of present problem with:  [] no personal factors and/or comorbidities that impact the plan of care;  [x]1-2 personal factors and/or comorbidities that impact the plan of care  []3 personal factors and/or comorbidities that impact the plan of care  [x] An examination of body systems using standardized tests and measures addressing any of the following: body structures and functions (impairments), activity limitations, and/or participation restrictions;:  [] a total of 1-2 or more elements   [] a total of 3 or more elements   [x] a total of 4 or more elements   [x] A clinical presentation with:  [x] stable and/or uncomplicated characteristics   [] evolving clinical presentation with changing characteristics  [] unstable and unpredictable characteristics;   [x] Clinical decision making of [x] low, [] moderate, [] high complexity using standardized patient assessment instrument and/or measurable assessment of functional outcome. [x] EVAL (LOW) 21129 (typically 20 minutes face-to-face)  [] EVAL (MOD) 26793 (typically 30 minutes face-to-face)  [] EVAL (HIGH) 85138 (typically 45 minutes face-to-face)  [] RE-EVAL     Reviewed insurance benefits for physical therapy in an outpatient hospital based setting with the patient, and patient has 30 allowable visit number. PLAN  Frequency/Duration:  1-2 days per week for 4-6 Weeks:  Interventions:  [x]  Therapeutic exercise including: strength training, ROM, for Lower extremity and core   [x]  NMR activation and proprioception for LE, Glutes and Core   [x]  Manual therapy as indicated for LE, Hip and spine to include: Dry Needling/IASTM, STM, PROM, Gr I-IV mobilizations, manipulation. [x] Modalities as needed that may include: thermal agents, E-stim, Biofeedback, US, iontophoresis as indicated  [x] Patient education on joint protection, postural re-education, activity modification, progression of HEP. HEP instruction: Access Code: Bishnu Hawk    GOALS: 3/3  Patient stated goal: Bend the R knee   [] Progressing: [] Met: [] Not Met: [] Adjusted   Patient stated goal: Walk without crutches  [] Progressing: [] Met: [] Not Met: [] Adjusted    Therapist goals for Patient:   Short Term Goals: To be achieved in: 2 weeks (3/17/22)  1. Independent in HEP and progression per patient tolerance, in order to prevent re-injury. [] Progressing: [] Met: [] Not Met: [] Adjusted   2. Patient will have a decrease in pain to facilitate improvement in movement, function, and ADLs as indicated by Functional Deficits. [] Progressing: [] Met: [] Not Met: [] Adjusted    Long Term Goals: To be achieved in: 6 weeks (4/14/22)  1.  Disability

## 2022-03-07 ENCOUNTER — TELEPHONE (OUTPATIENT)
Dept: INTERNAL MEDICINE CLINIC | Age: 52
End: 2022-03-07

## 2022-03-07 ENCOUNTER — OFFICE VISIT (OUTPATIENT)
Dept: ORTHOPEDIC SURGERY | Age: 52
End: 2022-03-07

## 2022-03-07 ENCOUNTER — HOSPITAL ENCOUNTER (OUTPATIENT)
Dept: PHYSICAL THERAPY | Age: 52
Setting detail: THERAPIES SERIES
Discharge: HOME | End: 2022-03-07
Payer: COMMERCIAL

## 2022-03-07 DIAGNOSIS — G89.29 CHRONIC PAIN OF RIGHT KNEE: Primary | ICD-10-CM

## 2022-03-07 DIAGNOSIS — M79.661 RIGHT CALF PAIN: ICD-10-CM

## 2022-03-07 DIAGNOSIS — M25.561 CHRONIC PAIN OF RIGHT KNEE: Primary | ICD-10-CM

## 2022-03-07 DIAGNOSIS — M70.41 PREPATELLAR BURSITIS, RIGHT KNEE: ICD-10-CM

## 2022-03-07 PROCEDURE — 99024 POSTOP FOLLOW-UP VISIT: CPT | Performed by: ORTHOPAEDIC SURGERY

## 2022-03-07 PROCEDURE — 97110 THERAPEUTIC EXERCISES: CPT | Performed by: PHYSICAL THERAPIST

## 2022-03-07 NOTE — FLOWSHEET NOTE
[address] [facility] [place],[address], [address] [facility], [address]  Phone: [phone]   Fax: [phone]    Physical Therapy Treatment Note/ Progress Report:       Date:  3/7/2022    Patient Name:  Linda Fritz    :  1970  MRN: [id number]  Restrictions/Precautions:    Medical/Treatment Diagnosis Information:  Diagnosis: S/p open incision R Knee Prepatellar bursectomy- M25.561, G89.29, M70.41- 22          Treatment Diagnosis: Decreased functional mobility, AROM, strength, ADL status, high-level IADLs, endurance, Increased Pain  Insurance/Certification information:  PT Insurance Information: [facility]  Physician Information:  Referring Practitioner: Dr. Esdras Garcia  Has the plan of care been signed (Y/N):        []  Yes  [x]  No     Date of Patient follow up with Physician: 3/24/22      Is this a Progress Report:     []  Yes  [x]  No        Progress report/Recertification will be due (10 Rx or 30 days whichever is less): 3/31/22      Visit # Insurance Allowable Auth Required   2 30 []  Yes []  No        Functional Scale:    Date assessed:  3/3/22   WOMAC- 59% Disability    Latex Allergy:  [x]NO      []YES  Preferred Language for Healthcare:   [x]English       []other:      Pain level:  5/10 in calf  3/7    SUBJECTIVE:  Pt stated that she is doing much better now since she has started walking on the crutches correctly. Stated she was a little sore following the last session. Her calf cramping has not improved since the last session. She states that she is continuing to drink water and stretch. She states she believes she has a family history of blood clots, stating her mom had them.    3/7    OBJECTIVE:  3/7   Observation:    Test measurements:     ROM  R knee flex - 123 deg        Louis Clinical Decision Rule for DVT    Date:  Clinical Presentation  Possible Score  Clients Score    Active cancer (within 6 months of diagnosis or receiving palliative care)  1  0   Paralysis, paresis, or recent immobilization of lower extremity  1  0   Bedridden for more than 3 days or major surgery in the last 4 weeks  1  1   Localized tenderness in the center of the posterior calf, the popliteal space, or along the femoral vein in the anterior thigh/groin  1  1   Entire lower extremity swelling  1  0   Unilateral calf swelling (more than 3 cm larger than uninvolved side)  1  0   Unilateral pitting edema  1  0   Collateral superficial veins (nonvaricose)  1  0   An alternative diagnosis is as likely (or more likely) than DVT (e.g., cellulitis, postoperative swelling, calf strain)  -2  0   Total Points   2     Key  · -2 to 0 Low probability of DVT 3% (95% confidence interval [CI] 1.7%-5.9%)  · 1 to 2 Moderate probability of DVT 17% (95% confidence interval [CI] 12%-23%)  · 3 or more High probability of DVT 75% (95% confidence interval [CI] 63%-84%)    Medical consultation is advised in the presence of low probability  Medical referral is required with moderate or high score. Louis PS, Armin DR, Jennifer J, et al: Value of assessment of pretest probability of deep-vein thrombosis in clinical management, Lancet 079:1679-4081, 1997.             RESTRICTIONS/PRECAUTIONS:     Exercises/Interventions: 3/3      Exercise/Equipment Resistance/Repetitions Other comments   Stretching     Hamstring Fwd bend 5 x 30\" Added 3/7   Towel Pull 5 x 30' Added 3/7   Inclined Calf     Hip Flexion     ITB     Groin     Quad                    SLR     Supine     Abduction     Adduction     Prone     SLR+          Isometrics     Quad sets      Patellar Glides     Medial     Superior     Inferior          ROM     Sheet Pulls     Hang Weights     Passive     Active     Weight Shift     Ankle Pumps     Heel Slides            CKC     Calf raises     Wall sits     Step ups     1 leg stand     Squatting     CC TKE     Balance     bridges          Gait training            PRE     Extension  RANGE: Flexion  RANGE:        Quantum machines     Leg press      Leg extension     Leg curl          Manual interventions                     Therapeutic Exercise and NMR EXR  [x] (73768) Provided verbal/tactile cueing for activities related to strengthening, flexibility, endurance, ROM for improvements in LE, proximal hip, and core control with self care, mobility, lifting, ambulation. [x] (70237) Provided verbal/tactile cueing for activities related to improving balance, coordination, kinesthetic sense, posture, motor skill, proprioception  to assist with LE, proximal hip, and core control in self care, mobility, lifting, ambulation and eccentric single leg control.      NMR and Therapeutic Activities:    [x] (83512 or 44194) Provided verbal/tactile cueing for activities related to improving balance, coordination, kinesthetic sense, posture, motor skill, proprioception and motor activation to allow for proper function of core, proximal hip and LE with self care and ADLs  [x] (03361) Gait Re-education- Provided training and instruction to the patient for proper LE, core and proximal hip recruitment and positioning and eccentric body weight control with ambulation re-education including up and down stairs     Home Exercise Program:    [x] (72564) Reviewed/Progressed HEP activities related to strengthening, flexibility, endurance, ROM of core, proximal hip and LE for functional self-care, mobility, lifting and ambulation/stair navigation   [x] (52519)Reviewed/Progressed HEP activities related to improving balance, coordination, kinesthetic sense, posture, motor skill, proprioception of core, proximal hip and LE for self care, mobility, lifting, and ambulation/stair navigation      Access Code: [email or web address]    Manual Treatments:  PROM / STM / Oscillations-Mobs:  G-I, II, III, IV (PA's, Inf., Post.)  [] (41297) Provided manual therapy to mobilize LE, proximal hip and/or LS spine soft tissue/joints for the purpose of modulating pain, promoting relaxation,  increasing ROM, reducing/eliminating soft tissue swelling/inflammation/restriction, improving soft tissue extensibility and allowing for proper ROM for normal function with self care, mobility, lifting and ambulation. Modalities:     [x] GAME READY (VASO)- for significant edema, swelling, pain control. Charges: 3/7  Timed Code Treatment Minutes: 10'   Total Treatment Minutes: 15'      [] EVAL (LOW) 40006 (typically 20 minutes face-to-face)  [] EVAL (MOD) 52442 (typically 30 minutes face-to-face)  [] EVAL (HIGH) 61019 (typically 45 minutes face-to-face)  [] RE-EVAL     [x] WA(58813) x   1  [] IONTO  [] NMR (95574) x     [] VASO x   [] Manual (09029) x     [] Other: Gait training   [] TA x      [] Mech Traction (55072)  [] ES(attended) (98936)      [] ES (un) (83974):       ASSESSMENT:      GOALS: 3/3  Patient stated goal: Bend the R knee   []? Progressing: []? Met: []? Not Met: []? Adjusted   Patient stated goal: Walk without crutches  []? Progressing: []? Met: []? Not Met: []? Adjusted     Therapist goals for Patient:   Short Term Goals: To be achieved in: 2 weeks (3/17/22)  1. Independent in HEP and progression per patient tolerance, in order to prevent re-injury. []? Progressing: []? Met: []? Not Met: []? Adjusted   2. Patient will have a decrease in pain to facilitate improvement in movement, function, and ADLs as indicated by Functional Deficits. []? Progressing: []? Met: []? Not Met: []? Adjusted     Long Term Goals: To be achieved in: 6 weeks (4/14/22)  1. Disability index score of 30% or less for the [facility] to assist with reaching prior level of function. []? Progressing: []? Met: []? Not Met: []? Adjusted  2. Patient will demonstrate increased R knee AROM to 125 degrees to allow for proper joint functioning as indicated by patients Functional Deficits. []? Progressing: []? Met: []? Not Met: []? Adjusted  3.  Patient will demonstrate an increase in R knee (Ext and flex) strength to 4/5 and good proximal hip strength (flex, ext, ABD) to 4/5 to allow for proper functional mobility as indicated by patients Functional Deficits. []? Progressing: []? Met: []? Not Met: []? Adjusted  4. Patient will return to home management functional activities without increased symptoms or restriction. []? Progressing: []? Met: []? Not Met: []? Adjusted  5. Patient will return to walking outside without increased pain or symptoms    []? Progressing: []? Met: []? Not Met: []? Adjusted            Overall Progression Towards Functional goals/ Treatment Progress Update:  [] Patient is progressing as expected towards functional goals listed. [] Progression is slowed due to complexities/Impairments listed. [] Progression has been slowed due to co-morbidities. [x] Plan just implemented, too soon to assess goals progression <30days   [] Goals require adjustment due to lack of progress  [] Patient is not progressing as expected and requires additional follow up with physician  [] Other    Prognosis for POC: [x] Good [] Fair  [] Poor      Patient requires continued skilled intervention: [x] Yes  [] No    Treatment/Activity Tolerance:  [x] Patient tolerated treatment well [] Patient limited by fatique  [] Patient limited by pain  [] Patient limited by other medical complications  [] Other: Pt demonstrated an increase in knee flexion PROM on this date. Due to continued posterior calf pain, family history of DVT, smoking hx, and sedentary lifestyle after surgery, the patient was assessed for DVT using the [address] for DVT, showing moderate risk for a DVT. Talked to referring MD, who is referring her out for a doppler ultrasound out of caution. 3/7      Patient education: Patient education on PT and plan of care including diagnosis, prognosis, treatment goals and options. Patient agrees with discussed POC and treatment and is aware of rehab process.  3/2    PLAN: 1-2 days per week for 4-6 Weeks 3/2  [] Continue per plan of care [] Ruth Vasquez current plan (see comments above)  [x] Plan of care initiated [] Hold pending MD visit [] Discharge      Electronically signed by: Michelle Tello student physical therapist  Therapist was present, directed the patient's care, made skilled judgement, and was responsible for assessment and treatment of the patient. Patient was in agreement to be treated by student physical therapist with licensed physical therapist present. Barb Grimes, PT, DPT     Note: If patient does not return for scheduled/ recommended follow up visits, this note will serve as a discharge from care along with most recent update on progress.

## 2022-03-07 NOTE — PROGRESS NOTES
Lauren Melinda was seen in physical therapy today. Therapist concerned about calf pain. She is status post open prepatellar bursal excision. Today, calf is soft but she complains of significant discomfort. To be safe we are going to proceed with Doppler ultrasound to rule out DVT.

## 2022-03-08 ENCOUNTER — OFFICE VISIT (OUTPATIENT)
Dept: INTERNAL MEDICINE CLINIC | Age: 52
End: 2022-03-08
Payer: COMMERCIAL

## 2022-03-08 VITALS
BODY MASS INDEX: 31.19 KG/M2 | SYSTOLIC BLOOD PRESSURE: 128 MMHG | HEIGHT: 61 IN | OXYGEN SATURATION: 99 % | HEART RATE: 67 BPM | DIASTOLIC BLOOD PRESSURE: 70 MMHG | WEIGHT: 165.2 LBS

## 2022-03-08 DIAGNOSIS — I82.451 ACUTE DEEP VEIN THROMBOSIS (DVT) OF RIGHT PERONEAL VEIN (HCC): Primary | ICD-10-CM

## 2022-03-08 PROCEDURE — 99214 OFFICE O/P EST MOD 30 MIN: CPT | Performed by: INTERNAL MEDICINE

## 2022-03-08 PROCEDURE — 3017F COLORECTAL CA SCREEN DOC REV: CPT | Performed by: INTERNAL MEDICINE

## 2022-03-08 PROCEDURE — G8427 DOCREV CUR MEDS BY ELIG CLIN: HCPCS | Performed by: INTERNAL MEDICINE

## 2022-03-08 PROCEDURE — G8419 CALC BMI OUT NRM PARAM NOF/U: HCPCS | Performed by: INTERNAL MEDICINE

## 2022-03-08 PROCEDURE — G8484 FLU IMMUNIZE NO ADMIN: HCPCS | Performed by: INTERNAL MEDICINE

## 2022-03-08 PROCEDURE — 4004F PT TOBACCO SCREEN RCVD TLK: CPT | Performed by: INTERNAL MEDICINE

## 2022-03-08 ASSESSMENT — ENCOUNTER SYMPTOMS
BLOOD IN STOOL: 0
VOMITING: 0
NAUSEA: 0
SHORTNESS OF BREATH: 0
SORE THROAT: 0
ABDOMINAL PAIN: 0
COUGH: 0

## 2022-03-08 NOTE — PROGRESS NOTES
Zully Sykes (:  1970) is a 49 y.o. female, Established patient, here for evaluation of the following chief complaint(s):  Leg Pain (blood clot in leg )         ASSESSMENT/PLAN:  1. Acute deep vein thrombosis (DVT) of right peroneal vein (HCC)  Provoked from recent knee surgery  Symptomatic  After discussing the benefits and risks of anticoagulation, patient chose to start anticoagulation. -     apixaban (ELIQUIS) 5 MG TABS tablet; Take 2 tablets by mouth 2 times daily for 7 days, Disp-28 tablet, R-0Normal  -     apixaban (ELIQUIS) 5 MG TABS tablet; Take 1 tablet by mouth 2 times daily Started from day 8 after finishing the loading dose for first week., Disp-60 tablet, R-2Normal  Will anticoagulate for 3 months. Return in about 4 weeks (around 2022). Subjective   SUBJECTIVE/OBJECTIVE:  Patient had R knee surgery last month and was undergoing physical therapy. She was complaining of R calf pain to the physical therapist and Duplex Ultrasound of R lower extremity was ordered by her orthopedic surgeon and performed at [facility] on 22 which showed R leg DVT. Leg Pain   The incident occurred more than 1 week ago. There was no injury mechanism. The pain is present in the right leg. The quality of the pain is described as aching. The pain is moderate. The pain has been constant since onset. The symptoms are aggravated by movement. Review of Systems   Constitutional: Negative for fatigue and fever. HENT: Negative for nosebleeds and sore throat. Respiratory: Negative for cough and shortness of breath. Cardiovascular: Negative for chest pain, palpitations and leg swelling. Gastrointestinal: Negative for abdominal pain, blood in stool, nausea and vomiting. Musculoskeletal:        R calf pain   Neurological: Negative for dizziness and weakness. Objective   Physical Exam  Constitutional:       Appearance: Normal appearance.    HENT:      Head: Normocephalic and atraumatic. Eyes:      General: No scleral icterus. Conjunctiva/sclera: Conjunctivae normal.   Cardiovascular:      Rate and Rhythm: Normal rate and regular rhythm. Pulses: Normal pulses. Heart sounds: Normal heart sounds. Pulmonary:      Effort: Pulmonary effort is normal.      Breath sounds: Normal breath sounds. Musculoskeletal:         General: No swelling. Right lower leg: Normal. No swelling or tenderness. No edema. Left lower leg: Normal. No swelling or tenderness. No edema. Skin:     General: Skin is warm and dry. Neurological:      Mental Status: She is alert and oriented to person, place, and time. Mental status is at baseline. Psychiatric:         Mood and Affect: Mood normal.         Behavior: Behavior normal.              An electronic signature was used to authenticate this note.     --Christopher Arriola MD

## 2022-03-10 ENCOUNTER — HOSPITAL ENCOUNTER (OUTPATIENT)
Dept: PHYSICAL THERAPY | Age: 52
Setting detail: THERAPIES SERIES
Discharge: HOME | End: 2022-03-10
Payer: COMMERCIAL

## 2022-03-10 PROCEDURE — 97112 NEUROMUSCULAR REEDUCATION: CPT | Performed by: PHYSICAL THERAPIST

## 2022-03-10 PROCEDURE — 97110 THERAPEUTIC EXERCISES: CPT | Performed by: PHYSICAL THERAPIST

## 2022-03-10 NOTE — FLOWSHEET NOTE
[address] [place] [address] [facility], [address] [facility], [address]  Phone: [phone]   Fax: [phone]    Physical Therapy Treatment Note/ Progress Report:       Date:  3/10/2022    Patient Name:  Lashay Gilliland    :  1970  MRN: [id number]  Restrictions/Precautions:    Medical/Treatment Diagnosis Information:  Diagnosis: S/p open incision R Knee Prepatellar bursectomy- M25.561, G89.29, M70.41- 22          Treatment Diagnosis: Decreased functional mobility, AROM, strength, ADL status, high-level IADLs, endurance, Increased Pain  Insurance/Certification information:  PT Insurance Information: [facility]  Physician Information:  Referring Practitioner: Dr. Consuelo Grey  Has the plan of care been signed (Y/N):        []  Yes  [x]  No     Date of Patient follow up with Physician: 3/24/22      Is this a Progress Report:     []  Yes  [x]  No        Progress report/Recertification will be due (10 Rx or 30 days whichever is less): 3/31/22      Visit # Insurance Allowable Auth Required   3 30 []  Yes []  No        Functional Scale:      WOMAC  3/3 - 59% Disability    Latex Allergy:  [x]NO      []YES  Preferred Language for Healthcare:   [x]English       []other:      Pain level:  0 /10   3/10    SUBJECTIVE:  Pt states she is feeling better. She is not taking any tylenol or pain medication. She states she started taking her blood thinner two days ago after being positive for the DVT. She states she stopped using the crutches yesterday.     3/10    OBJECTIVE:  3/10   Observation:    Test measurements:     ROM  R knee flex - 128 deg with sheet pulls           RESTRICTIONS/PRECAUTIONS:     Exercises/Interventions: 3/3      Exercise/Equipment Resistance/Repetitions Other comments   Stretching     Hamstring Fwd bend 5 x 30\" Added 3/7   Towel Pull Inclined Calf 30 sec x 5 Added 3/10   Hip Flexion     ITB     Groin     Quad                    SLR     Supine 10 x 3 Added 3/10   Abduction 10 x 3  Added 3/10   Adduction     Prone 10 x 3  Added 3/10   SLR+          Isometrics     Quad sets 5 sec hold x 20  ^3/10        Patellar Glides     Medial     Superior     Inferior          ROM     Sheet Pulls     Hang Weights     Passive     Active     Weight Shift     Ankle Pumps     Heel Slides  10 x 10 sec Added 3/3             CKC     Calf raises     Wall sits     Step ups     1 leg stand 15 sec x 5 Added 3/10   Squatting     CC TKE 10 x 9 3 plates Added 6/07   Balance     bridges          Gait training            PRE     Extension  RANGE:   Flexion  RANGE:        Quantum machines     Leg press      Leg extension     Leg curl          Manual interventions                   Therapeutic Exercise and NMR EXR  [x] (17183) Provided verbal/tactile cueing for activities related to strengthening, flexibility, endurance, ROM for improvements in LE, proximal hip, and core control with self care, mobility, lifting, ambulation. [x] (39901) Provided verbal/tactile cueing for activities related to improving balance, coordination, kinesthetic sense, posture, motor skill, proprioception  to assist with LE, proximal hip, and core control in self care, mobility, lifting, ambulation and eccentric single leg control.      NMR and Therapeutic Activities:    [x] (50225 or 34251) Provided verbal/tactile cueing for activities related to improving balance, coordination, kinesthetic sense, posture, motor skill, proprioception and motor activation to allow for proper function of core, proximal hip and LE with self care and ADLs  [x] (30217) Gait Re-education- Provided training and instruction to the patient for proper LE, core and proximal hip recruitment and positioning and eccentric body weight control with ambulation re-education including up and down stairs     Home Exercise Program:    [x] (30940) Reviewed/Progressed HEP activities related to strengthening, flexibility, endurance, ROM of core, proximal hip and LE for functional self-care, mobility, lifting and ambulation/stair navigation   [x] (20514)Reviewed/Progressed HEP activities related to improving balance, coordination, kinesthetic sense, posture, motor skill, proprioception of core, proximal hip and LE for self care, mobility, lifting, and ambulation/stair navigation      Access Code: [email or web address]    Manual Treatments:  PROM / STM / Oscillations-Mobs:  G-I, II, III, IV (PA's, Inf., Post.)  [] (29855) Provided manual therapy to mobilize LE, proximal hip and/or LS spine soft tissue/joints for the purpose of modulating pain, promoting relaxation,  increasing ROM, reducing/eliminating soft tissue swelling/inflammation/restriction, improving soft tissue extensibility and allowing for proper ROM for normal function with self care, mobility, lifting and ambulation. Modalities:     [x] GAME READY (VASO)- for significant edema, swelling, pain control. Charges: 3/10  Timed Code Treatment Minutes: 45'    Total Treatment Minutes: 45'       [] EVAL (LOW) 16165 (typically 20 minutes face-to-face)  [] EVAL (MOD) 91104 (typically 30 minutes face-to-face)  [] EVAL (HIGH) 73766 (typically 45 minutes face-to-face)  [] RE-EVAL     [x] KV(44210) x   2  [] IONTO  [x] NMR (14580) x  1   [] VASO x   [] Manual (52703) x     [] Other: Gait training   [] TA x      [] Mech Traction (81877)  [] ES(attended) (75064)      [] ES (un) (57607):       ASSESSMENT:       GOALS: 3/3  Patient stated goal: Bend the R knee   []? Progressing: []? Met: []? Not Met: []? Adjusted   Patient stated goal: Walk without crutches  []? Progressing: []? Met: []? Not Met: []? Adjusted     Therapist goals for Patient:   Short Term Goals: To be achieved in: 2 weeks (3/17/22)  1. Independent in HEP and progression per patient tolerance, in order to prevent re-injury. []? Progressing: []? Met: []? Not Met: []? Adjusted   2.  Patient will have a decrease in pain to facilitate improvement in movement, function, and ADLs as indicated by Functional Deficits. []? Progressing: []? Met: []? Not Met: []? Adjusted     Long Term Goals: To be achieved in: 6 weeks (4/14/22)  1. Disability index score of 30% or less for the [facility] to assist with reaching prior level of function. []? Progressing: []? Met: []? Not Met: []? Adjusted  2. Patient will demonstrate increased R knee AROM to 125 degrees to allow for proper joint functioning as indicated by patients Functional Deficits. []? Progressing: []? Met: []? Not Met: []? Adjusted  3. Patient will demonstrate an increase in R knee (Ext and flex) strength to 4/5 and good proximal hip strength (flex, ext, ABD) to 4/5 to allow for proper functional mobility as indicated by patients Functional Deficits. []? Progressing: []? Met: []? Not Met: []? Adjusted  4. Patient will return to home management functional activities without increased symptoms or restriction. []? Progressing: []? Met: []? Not Met: []? Adjusted  5. Patient will return to walking outside without increased pain or symptoms    []? Progressing: []? Met: []? Not Met: []? Adjusted            Overall Progression Towards Functional goals/ Treatment Progress Update:  [] Patient is progressing as expected towards functional goals listed. [] Progression is slowed due to complexities/Impairments listed. [] Progression has been slowed due to co-morbidities. [x] Plan just implemented, too soon to assess goals progression <30days   [] Goals require adjustment due to lack of progress  [] Patient is not progressing as expected and requires additional follow up with physician  [] Other    Prognosis for POC: [x] Good [] Fair  [] Poor      Patient requires continued skilled intervention: [x] Yes  [] No    Treatment/Activity Tolerance:  [x] Patient tolerated treatment well [] Patient limited by fatique  [] Patient limited by pain  [] Patient limited by other medical complications  [] Other: Pt jacob session well.  She required min VCs to maintain quad contraction with supine SLRs. She had no pain with new exercises. Did well with SLS. She found TKEs to be very easy and did extra sets. Discussed ankle weights for home SLRs for when they become easy. 3/10      Patient education: Patient education on PT and plan of care including diagnosis, prognosis, treatment goals and options. Patient agrees with discussed POC and treatment and is aware of rehab process. 3/2    PLAN: 1-2 days per week for 4-6 Weeks 3/2  [] Continue per plan of care [] Alter current plan (see comments above)  [x] Plan of care initiated [] Hold pending MD visit [] Discharge      Electronically signed by:    Clent Mcburney, PT, DPT     Note: If patient does not return for scheduled/ recommended follow up visits, this note will serve as a discharge from care along with most recent update on progress.

## 2022-03-14 ENCOUNTER — HOSPITAL ENCOUNTER (OUTPATIENT)
Dept: PHYSICAL THERAPY | Age: 52
Setting detail: THERAPIES SERIES
Discharge: HOME | End: 2022-03-14
Payer: COMMERCIAL

## 2022-03-14 PROCEDURE — 97110 THERAPEUTIC EXERCISES: CPT | Performed by: PHYSICAL THERAPIST

## 2022-03-14 PROCEDURE — 97112 NEUROMUSCULAR REEDUCATION: CPT | Performed by: PHYSICAL THERAPIST

## 2022-03-14 PROCEDURE — 97530 THERAPEUTIC ACTIVITIES: CPT | Performed by: PHYSICAL THERAPIST

## 2022-03-14 NOTE — FLOWSHEET NOTE
[address] [place], [place] [address], [address] [facility], [address]  Phone: [phone]   Fax: [phone]    Physical Therapy Treatment Note/ Progress Report:       Date:  3/14/2022    Patient Name:  Marti Langston    :  1970  MRN: [id number]  Restrictions/Precautions:    Medical/Treatment Diagnosis Information:  Diagnosis: S/p open incision R Knee Prepatellar bursectomy- M25.561, G89.29, M70.41- 22          Treatment Diagnosis: Decreased functional mobility, AROM, strength, ADL status, high-level IADLs, endurance, Increased Pain  Insurance/Certification information:  PT Insurance Information: [facility]  Physician Information:  Referring Practitioner: Dr. Kiki Hoyt  Has the plan of care been signed (Y/N):        []  Yes  [x]  No     Date of Patient follow up with Physician: 3/24/22      Is this a Progress Report:     []  Yes  [x]  No        Progress report/Recertification will be due (10 Rx or 30 days whichever is less): 3/31/22      Visit # Insurance Allowable Auth Required   4 30 []  Yes []  No        Functional Scale:      WOMAC  3/3 - 59% Disability    Latex Allergy:  [x]NO      []YES  Preferred Language for Healthcare:   [x]English       []other:      Pain level:  0 /10   3/14    SUBJECTIVE:  Pt states she is feeling pretty good. No issues over the weekend. No pain. Reported that the last session was intense but good.    3/14    OBJECTIVE:  3/10   Observation:    Test measurements:     ROM  R knee flex - 128 deg with sheet pulls           RESTRICTIONS/PRECAUTIONS:     Exercises/Interventions: 3/3      Exercise/Equipment Resistance/Repetitions Other comments   Stretching     Hamstring Fwd bend 5 x 30\" 3/14   Towel Pull Inclined Calf 30 sec x 5 3/14   Hip Flexion     ITB     Groin     Quad                    SLR     Supine 10 x 3 #3 ^3/14   Abduction 10 x 3  3/14   Adduction     Prone 10 x 3  3/14   SLR+          Isometrics     IronPearl Patellar Glides     Medial     Superior     Inferior          ROM     Sheet Pulls     Hang Weights     Passive     Active     Weight Shift     Ankle Pumps     Heel Slides            CKC     Calf raises     Wall sits     Step ups     1 leg stand 20 sec x 5 ^3/14   Squatting     CC TKE 12 x 3 6 plates ^4/99   Balance     bridges          Gait training  Step ups 3 x 12 4 in step  Added 3/14   Banded walking sideways  Added 3/14                            PRE     Extension  RANGE:   Flexion  RANGE:        Quantum machines     Leg press  3 x 10 80# Added 3/14   Leg extension 3 x 10 25# Added 3/14   Leg curl          Manual interventions                   Therapeutic Exercise and NMR EXR  [x] (76432) Provided verbal/tactile cueing for activities related to strengthening, flexibility, endurance, ROM for improvements in LE, proximal hip, and core control with self care, mobility, lifting, ambulation. [x] (49656) Provided verbal/tactile cueing for activities related to improving balance, coordination, kinesthetic sense, posture, motor skill, proprioception  to assist with LE, proximal hip, and core control in self care, mobility, lifting, ambulation and eccentric single leg control.      NMR and Therapeutic Activities:    [x] (73513 or 82088) Provided verbal/tactile cueing for activities related to improving balance, coordination, kinesthetic sense, posture, motor skill, proprioception and motor activation to allow for proper function of core, proximal hip and LE with self care and ADLs  [x] (48442) Gait Re-education- Provided training and instruction to the patient for proper LE, core and proximal hip recruitment and positioning and eccentric body weight control with ambulation re-education including up and down stairs     Home Exercise Program:    [x] (66489) Reviewed/Progressed HEP activities related to strengthening, flexibility, endurance, ROM of core, proximal hip and LE for functional self-care, mobility, lifting and ambulation/stair navigation   [x] (74191)Reviewed/Progressed HEP activities related to improving balance, coordination, kinesthetic sense, posture, motor skill, proprioception of core, proximal hip and LE for self care, mobility, lifting, and ambulation/stair navigation      Access Code: [email or web address]    Manual Treatments:  PROM / STM / Oscillations-Mobs:  G-I, II, III, IV (PA's, Inf., Post.)  [] (50733) Provided manual therapy to mobilize LE, proximal hip and/or LS spine soft tissue/joints for the purpose of modulating pain, promoting relaxation,  increasing ROM, reducing/eliminating soft tissue swelling/inflammation/restriction, improving soft tissue extensibility and allowing for proper ROM for normal function with self care, mobility, lifting and ambulation. Modalities:     [x] GAME READY (VASO)- for significant edema, swelling, pain control. Charges: 3/14  Timed Code Treatment Minutes: 55'    Total Treatment Minutes: 48'       [] EVAL (LOW) 05795 (typically 20 minutes face-to-face)  [] EVAL (MOD) 47680 (typically 30 minutes face-to-face)  [] EVAL (HIGH) 06061 (typically 45 minutes face-to-face)  [] RE-EVAL     [x] CE(31585) x   1  [] IONTO  [x] NMR (51375) x  1   [] VASO x   [] Manual (02529) x     [] Other: Gait training   [x] TA x 1     [] Mech Traction (18870)  [] ES(attended) (37435)      [] ES (un) (98589):       ASSESSMENT:       GOALS: 3/3  Patient stated goal: Bend the R knee   []? Progressing: []? Met: []? Not Met: []? Adjusted   Patient stated goal: Walk without crutches  []? Progressing: []? Met: []? Not Met: []? Adjusted     Therapist goals for Patient:   Short Term Goals: To be achieved in: 2 weeks (3/17/22)  1. Independent in HEP and progression per patient tolerance, in order to prevent re-injury. []? Progressing: []? Met: []? Not Met: []? Adjusted   2. Patient will have a decrease in pain to facilitate improvement in movement, function, and ADLs as indicated by Functional Deficits. []? Progressing: []? Met: []? Not Met: []? Adjusted     Long Term Goals: To be achieved in: 6 weeks (4/14/22)  1. Disability index score of 30% or less for the [facility] to assist with reaching prior level of function. []? Progressing: []? Met: []? Not Met: []? Adjusted  2. Patient will demonstrate increased R knee AROM to 125 degrees to allow for proper joint functioning as indicated by patients Functional Deficits. []? Progressing: []? Met: []? Not Met: []? Adjusted  3. Patient will demonstrate an increase in R knee (Ext and flex) strength to 4/5 and good proximal hip strength (flex, ext, ABD) to 4/5 to allow for proper functional mobility as indicated by patients Functional Deficits. []? Progressing: []? Met: []? Not Met: []? Adjusted  4. Patient will return to home management functional activities without increased symptoms or restriction. []? Progressing: []? Met: []? Not Met: []? Adjusted  5. Patient will return to walking outside without increased pain or symptoms    []? Progressing: []? Met: []? Not Met: []? Adjusted            Overall Progression Towards Functional goals/ Treatment Progress Update:  [] Patient is progressing as expected towards functional goals listed. [] Progression is slowed due to complexities/Impairments listed. [] Progression has been slowed due to co-morbidities. [x] Plan just implemented, too soon to assess goals progression <30days   [] Goals require adjustment due to lack of progress  [] Patient is not progressing as expected and requires additional follow up with physician  [] Other    Prognosis for POC: [x] Good [] Fair  [] Poor      Patient requires continued skilled intervention: [x] Yes  [] No    Treatment/Activity Tolerance:  [x] Patient tolerated treatment well [] Patient limited by fatique  [] Patient limited by pain  [] Patient limited by other medical complications  [] Other: Pt jacob session well.  Patient tolerated additional machine based strengthening

## 2022-03-17 ENCOUNTER — HOSPITAL ENCOUNTER (OUTPATIENT)
Dept: PHYSICAL THERAPY | Age: 52
Setting detail: THERAPIES SERIES
Discharge: HOME | End: 2022-03-17
Payer: COMMERCIAL

## 2022-03-17 NOTE — FLOWSHEET NOTE
Physical Therapy  Cancellation/No-show Note  Patient Name:  Keith Mcgarry  :  1970   Date:  3/17/2022  Cancelled visits to date: 01  No-shows to date: 0    For today's appointment patient:  [x]  Cancelled  []  Rescheduled appointment  []  No-show     Reason given by patient:  []  Patient ill  []  Conflicting appointment  []  No transportation    []  Conflict with work  []  No reason given  [x]  Other:     Comments:  Pt cancelled due to car problems    Electronically signed by:  Dallas Muro PT, PT

## 2022-03-21 ENCOUNTER — HOSPITAL ENCOUNTER (OUTPATIENT)
Dept: PHYSICAL THERAPY | Age: 52
Setting detail: THERAPIES SERIES
Discharge: HOME | End: 2022-03-21
Payer: COMMERCIAL

## 2022-03-21 PROCEDURE — 97112 NEUROMUSCULAR REEDUCATION: CPT | Performed by: PHYSICAL THERAPIST

## 2022-03-21 PROCEDURE — 97530 THERAPEUTIC ACTIVITIES: CPT | Performed by: PHYSICAL THERAPIST

## 2022-03-21 PROCEDURE — 97110 THERAPEUTIC EXERCISES: CPT | Performed by: PHYSICAL THERAPIST

## 2022-03-21 NOTE — PLAN OF CARE
[facility], [address] [place], [address]  Phone: [phone]   Fax: [phone]     Physical Therapy Re-Certification Plan of Care    Dear  Dr. Lindsey Robles,    We had the pleasure of treating the following patient for physical therapy services at [address] [place] [address]. A summary of our findings can be found in the updated assessment below. This includes our plan of care. If you have any questions or concerns regarding these findings, please do not hesitate to contact me at the office phone number checked above. Thank you for the referral.     Physician Signature:________________________________Date:__________________  By signing above (or electronic signature), therapists plan is approved by physician      Overall Response to Treatment:   [x]Patient is responding well to treatment and improvement is noted with regards  to goals   []Patient should continue to improve in reasonable time if they continue HEP   []Patient has plateaued and is no longer responding to skilled PT intervention    []Patient is getting worse and would benefit from return to referring MD   []Patient unable to adhere to initial POC   [x]Other: Patient has made significant gains in knee AROM and functional strength since beginning physical therapy. She continues to present with decreased LE strength and functional mobility with home management. She continues to be a good candidate for skilled physical therapy at a decreased frequency of 1 time a week to address her current impairments and allow her to return her previous level of function. She was provided with an updated HEP and reported understanding at this time.     Date range of previous POC Visits: 3/3/22-3/21/22    Total Visits: 5    Physical Therapy Treatment Note/ Progress Report:       Date:  3/21/2022    Patient Name:  Adin Lee    :  1970  MRN: [id number]  Restrictions/Precautions: Medical/Treatment Diagnosis Information:  Diagnosis: S/p open incision R Knee Prepatellar bursectomy- M25.561, G89.29, M70.41- 2/2/22          Treatment Diagnosis: Decreased functional mobility, AROM, strength, ADL status, high-level IADLs, endurance, Increased Pain  Insurance/Certification information:  PT Insurance Information: [facility] -   Physician Information:  Referring Practitioner: Dr. Dwayne Knowles  Has the plan of care been signed (Y/N):        []  Yes  [x]  No     Date of Patient follow up with Physician: 3/24/22      Is this a Progress Report:     [x]  Yes  []  No        Progress report/Recertification will be due (10 Rx or 30 days whichever is less): 4/20/22      Visit # Insurance Allowable Auth Required   5 30 []  Yes []  No        Functional Scale:      WOMAC  3/3 - 59% Disability  3/21 - 58% Disability    Latex Allergy:  [x]NO      []YES  Preferred Language for Healthcare:   [x]English       []other:      Pain level:  0 /10   3/21    SUBJECTIVE:  Pt states she is feeling pretty good. No new issues, pain, concerns. Sore after the last session.    3/21    OBJECTIVE:  3/21    Observation:    Test measurements:        Flexibility L R Comment   Hamstring 23 degrees from 0 13 degrees from 0  90/90   ITB Negative Negative  [place]   Quad Limited Limited [place]       ROM PROM AROM Comment     L R L R     Flexion   72 degrees  125 degrees 140 degrees     Extension     0 degrees 1 degrees of hyper extension          Strength L R Comment   Quad 4+/5 4-/5     Hamstring 4-/5 4-/5     Hip  flexion 4/5 3+/5     Hip Ext 4-/5 3+/5    Hip abd 4-/5 3+/5         RESTRICTIONS/PRECAUTIONS:     Exercises/Interventions: 3/3      Exercise/Equipment Resistance/Repetitions Other comments   Stretching     Hamstring Fwd bend 5 x 30\" 3/14   Towel Pull Inclined Calf 30 sec x 5 3/14   Hip Flexion     ITB     Groin     Quad Prone quad stretch with band 3 x 30' Added 3/21        Bike  5'         SLR     Supine Abduction Adduction Prone SLR+          Isometrics     Quad sets      Patellar Glides     Medial     Superior     Inferior          ROM     Sheet Pulls     Hang Weights     Passive     Active     Weight Shift     Ankle Pumps     Heel Slides            CKC     Calf raises     Wall sits     Step ups     1 leg stand Squatting     CC TKE Balance     bridges          Gait training  Step ups 3 x 10 4 in step  ^3/21   Banded walking sideways 2 x 35' blue band ^3/21   Standing Hip Ext 3 x 10 Blue band Added 3/21   Lateral Step ups 3 x 10 Added 3/21   Sit to stands  3 x 10 Added 3/21             PRE     Extension  RANGE:   Flexion  RANGE:        Quantum machines     Leg press  Leg extension Leg curl          Manual interventions                   Therapeutic Exercise and NMR EXR  [x] (49784) Provided verbal/tactile cueing for activities related to strengthening, flexibility, endurance, ROM for improvements in LE, proximal hip, and core control with self care, mobility, lifting, ambulation. [x] (71600) Provided verbal/tactile cueing for activities related to improving balance, coordination, kinesthetic sense, posture, motor skill, proprioception  to assist with LE, proximal hip, and core control in self care, mobility, lifting, ambulation and eccentric single leg control.      NMR and Therapeutic Activities:    [x] (26978 or 13377) Provided verbal/tactile cueing for activities related to improving balance, coordination, kinesthetic sense, posture, motor skill, proprioception and motor activation to allow for proper function of core, proximal hip and LE with self care and ADLs  [x] (33365) Gait Re-education- Provided training and instruction to the patient for proper LE, core and proximal hip recruitment and positioning and eccentric body weight control with ambulation re-education including up and down stairs     Home Exercise Program:    [x] (46281) Reviewed/Progressed HEP activities related to strengthening, flexibility, endurance, ROM of core, proximal hip and LE for functional self-care, mobility, lifting and ambulation/stair navigation   [x] (71809)Reviewed/Progressed HEP activities related to improving balance, coordination, kinesthetic sense, posture, motor skill, proprioception of core, proximal hip and LE for self care, mobility, lifting, and ambulation/stair navigation      Access Code: [email or web address]    Manual Treatments:  PROM / STM / Oscillations-Mobs:  G-I, II, III, IV (PA's, Inf., Post.)  [] (76882) Provided manual therapy to mobilize LE, proximal hip and/or LS spine soft tissue/joints for the purpose of modulating pain, promoting relaxation,  increasing ROM, reducing/eliminating soft tissue swelling/inflammation/restriction, improving soft tissue extensibility and allowing for proper ROM for normal function with self care, mobility, lifting and ambulation. Modalities:     [x] GAME READY (VASO)- for significant edema, swelling, pain control. Charges: 3/21  Timed Code Treatment Minutes: 47'    Total Treatment Minutes: 61'       [] EVAL (LOW) 24835 (typically 20 minutes face-to-face)  [] EVAL (MOD) 41598 (typically 30 minutes face-to-face)  [] EVAL (HIGH) 64475 (typically 45 minutes face-to-face)  [] RE-EVAL     [x] RC(40755) x   2  [] IONTO  [x] NMR (62907) x  1   [] VASO x   [] Manual (49188) x     [] Other: Gait training   [x] TA x 1     [] Mech Traction (85526)  [] ES(attended) (07712)      [] ES (un) (17111):       ASSESSMENT:       GOALS: 3/21  Patient stated goal: Bend the R knee   []? Progressing: [x]? Met: []? Not Met: []? Adjusted   Patient stated goal: Walk without crutches  []? Progressing: [x]? Met: []? Not Met: []? Adjusted     Therapist goals for Patient:   Short Term Goals: To be achieved in: 2 weeks (3/17/22)  1. Independent in HEP and progression per patient tolerance, in order to prevent re-injury. []? Progressing: [x]? Met: []? Not Met: []? Adjusted   2.  Patient will have a decrease in pain to facilitate improvement in movement, function, and ADLs as indicated by Functional Deficits. []? Progressing: [x]? Met: []? Not Met: []? Adjusted     Long Term Goals: To be achieved in: 6 weeks (4/14/22)  1. Disability index score of 30% or less for the [facility] to assist with reaching prior level of function. [x]? Progressing: []? Met: []? Not Met: []? Adjusted  2. Patient will demonstrate increased R knee AROM to 125 degrees to allow for proper joint functioning as indicated by patients Functional Deficits. []? Progressing: [x]? Met: []? Not Met: []? Adjusted  3. Patient will demonstrate an increase in R knee (Ext and flex) strength to 4/5 and good proximal hip strength (flex, ext, ABD) to 4/5 to allow for proper functional mobility as indicated by patients Functional Deficits. [x]? Progressing: []? Met: []? Not Met: []? Adjusted  4. Patient will return to home management functional activities without increased symptoms or restriction. [x]? Progressing: []? Met: []? Not Met: []? Adjusted  5. Patient will return to walking outside without increased pain or symptoms    []? Progressing: [x]? Met: []? Not Met: []? Adjusted            Overall Progression Towards Functional goals/ Treatment Progress Update:  [x] Patient is progressing as expected towards functional goals listed. [] Progression is slowed due to complexities/Impairments listed. [] Progression has been slowed due to co-morbidities.   [] Plan just implemented, too soon to assess goals progression <30days   [] Goals require adjustment due to lack of progress  [] Patient is not progressing as expected and requires additional follow up with physician  [] Other    Prognosis for POC: [x] Good [] Fair  [] Poor      Patient requires continued skilled intervention: [x] Yes  [] No    Treatment/Activity Tolerance:  [x] Patient tolerated treatment well [] Patient limited by fatique  [] Patient limited by pain  [] Patient limited by other medical complications  [] Other: Pt jacob session well. Patient has made significant gains in knee AROM and functional strength since beginning physical therapy. She continues to present with decreased LE strength and functional mobility with home management. She continues to be a good candidate for skilled physical therapy at a decreased frequency of 1 time a week to address her current impairments and allow her to return her previous level of function. She was provided with an updated HEP and reported understanding at this time. 3/21      Patient education: Patient education on PT and plan of care including diagnosis, prognosis, treatment goals and options. Patient agrees with discussed POC and treatment and is aware of rehab process. 3/21    PLAN: 1 days per week for 4-6 Weeks (3/21 - 4/13)   [x] Continue per plan of care [] Alter current plan (see comments above)  [] Plan of care initiated [] Hold pending MD visit [] Discharge      Electronically signed by: Azra Sands student physical therapist  Therapist was present, directed the patient's care, made skilled judgement, and was responsible for assessment and treatment of the patient. Patient was in agreement to be treated by student physical therapist with licensed physical therapist present. Rashawn Nugent, PT, DPT     Note: If patient does not return for scheduled/ recommended follow up visits, this note will serve as a discharge from care along with most recent update on progress.

## 2022-03-24 ENCOUNTER — HOSPITAL ENCOUNTER (OUTPATIENT)
Dept: PHYSICAL THERAPY | Age: 52
Setting detail: THERAPIES SERIES
Discharge: HOME | End: 2022-03-24
Payer: COMMERCIAL

## 2022-03-24 ENCOUNTER — OFFICE VISIT (OUTPATIENT)
Dept: ORTHOPEDIC SURGERY | Age: 52
End: 2022-03-24

## 2022-03-24 VITALS — HEIGHT: 61 IN | WEIGHT: 165 LBS | BODY MASS INDEX: 31.15 KG/M2

## 2022-03-24 DIAGNOSIS — M25.561 CHRONIC PAIN OF RIGHT KNEE: Primary | ICD-10-CM

## 2022-03-24 DIAGNOSIS — G89.29 CHRONIC PAIN OF RIGHT KNEE: Primary | ICD-10-CM

## 2022-03-24 DIAGNOSIS — M70.41 PREPATELLAR BURSITIS, RIGHT KNEE: ICD-10-CM

## 2022-03-24 PROCEDURE — 97112 NEUROMUSCULAR REEDUCATION: CPT | Performed by: PHYSICAL THERAPIST

## 2022-03-24 PROCEDURE — 99024 POSTOP FOLLOW-UP VISIT: CPT | Performed by: ORTHOPAEDIC SURGERY

## 2022-03-24 PROCEDURE — 97110 THERAPEUTIC EXERCISES: CPT | Performed by: PHYSICAL THERAPIST

## 2022-03-24 NOTE — PROGRESS NOTES
Sofia Hughes returns today for her right knee. She is 6 weeks status post open right prepatellar bursal excision. She is doing really well and reports no pain. She is exceedingly pleased with how therapy is gone. She reports no pain. Her calf is feeling better since she has been treated for DVT. Today, incision is nicely healed. She has no warmth erythema. Calf is soft. She has full motion and good quad tone. She will continue with rehab over the next month to improve her endurance strength.   Follow-up with me at anytime on an as-needed basis peer

## 2022-03-24 NOTE — FLOWSHEET NOTE
[address] [place] [address] [facility], [address] [facility], [address]  Phone: [phone]   Fax: [phone]  Physical Therapy Treatment Note/ Progress Report:       Date:  3/24/2022    Patient Name:  Kenyon Santana    :  1970  MRN: [id number]  Restrictions/Precautions:    Medical/Treatment Diagnosis Information:  Diagnosis: S/p open incision R Knee Prepatellar bursectomy- M25.561, G89.29, M70.41- 22          Treatment Diagnosis: Decreased functional mobility, AROM, strength, ADL status, high-level IADLs, endurance, Increased Pain  Insurance/Certification information:  PT Insurance Information: [facility]  Physician Information:  Referring Practitioner: Dr. Garcia Days  Has the plan of care been signed (Y/N):        []  Yes  [x]  No     Date of Patient follow up with Physician: 3/24/22      Is this a Progress Report:     [x]  Yes  []  No        Progress report/Recertification will be due (10 Rx or 30 days whichever is less): 22      Visit # Insurance Allowable Auth Required   6 30 []  Yes []  No        Functional Scale:      WOMAC  3/3 - 59% Disability  3/21 - 58% Disability    Latex Allergy:  [x]NO      []YES  Preferred Language for Healthcare:   [x]English       []other:      Pain level:  0 /10   3/24    SUBJECTIVE:  Pt states she is feeling good today. Saw the doctor and he said to continue with therapy. Felt good after the last session.     3/24    OBJECTIVE:  3/21    Observation:    Test measurements:        Flexibility L R Comment   Hamstring 23 degrees from 0 13 degrees from 0  90/90   ITB Negative Negative  Monica   Quad Limited Limited E[place]       ROM PROM AROM Comment     L R L R     Flexion   72 degrees  125 degrees 140 degrees     Extension     0 degrees 1 degrees of hyper extension          Strength L R Comment   Quad 4+/5 4-/5     Hamstring 4-/5 4-/5     Hip  flexion 4/5 3+/5     Hip Ext 4-/5 3+/5    Hip abd 4-/5 3+/5   RESTRICTIONS/PRECAUTIONS:     Exercises/Interventions: 3/3      Exercise/Equipment Resistance/Repetitions Other comments   Stretching     Hamstring Fwd bend 5 x 30\" 3/24   Towel Pull Inclined Calf 30 sec x 5 3/24   Hip Flexion     ITB     Groin     Quad Prone quad stretch with band 3 x 30' 3/24        Bike  5'         SLR     Supine Abduction Adduction     Prone SLR+          Isometrics     Quad sets      Patellar Glides     Medial     Superior     Inferior          ROM     Sheet Pulls     Hang Weights     Passive     Active     Weight Shift     Ankle Pumps     Heel Slides            CKC     Calf raises     Wall sits 3 x 30\" Added 3/24   Step ups     1 leg stand Squatting     CC TKE Balance     bridges          Gait training  Step ups 3 x 12 big green step  ^3/24   Banded Monster walk 2 x 35' blue band Added 3/24   Banded walking sideways 2 x 35' blue band 3/24   Standing Hip Ext Lateral Step ups 3 x 12 big green step  ^3/24   Sit to stands  3 x 12 ^3/24             PRE     Extension  RANGE:   Flexion  RANGE:        Quantum machines     Leg press  3 x 12 100# 3/24   Leg extension 3 x 12 30# 3/24   Leg curl 3 x 12 30# Added 3/24        Manual interventions                   Therapeutic Exercise and NMR EXR  [x] (18301) Provided verbal/tactile cueing for activities related to strengthening, flexibility, endurance, ROM for improvements in LE, proximal hip, and core control with self care, mobility, lifting, ambulation. [x] (61957) Provided verbal/tactile cueing for activities related to improving balance, coordination, kinesthetic sense, posture, motor skill, proprioception  to assist with LE, proximal hip, and core control in self care, mobility, lifting, ambulation and eccentric single leg control.      NMR and Therapeutic Activities:    [x] (47139 or 35187) Provided verbal/tactile cueing for activities related to improving balance, coordination, kinesthetic sense, posture, motor skill, proprioception and motor activation to allow for proper function of core, proximal hip and LE with self care and ADLs  [x] (02626) Gait Re-education- Provided training and instruction to the patient for proper LE, core and proximal hip recruitment and positioning and eccentric body weight control with ambulation re-education including up and down stairs     Home Exercise Program:    [x] (92812) Reviewed/Progressed HEP activities related to strengthening, flexibility, endurance, ROM of core, proximal hip and LE for functional self-care, mobility, lifting and ambulation/stair navigation   [x] (60105)Reviewed/Progressed HEP activities related to improving balance, coordination, kinesthetic sense, posture, motor skill, proprioception of core, proximal hip and LE for self care, mobility, lifting, and ambulation/stair navigation      Access Code: [email or web address]    Manual Treatments:  PROM / STM / Oscillations-Mobs:  G-I, II, III, IV (PA's, Inf., Post.)  [] (22506) Provided manual therapy to mobilize LE, proximal hip and/or LS spine soft tissue/joints for the purpose of modulating pain, promoting relaxation,  increasing ROM, reducing/eliminating soft tissue swelling/inflammation/restriction, improving soft tissue extensibility and allowing for proper ROM for normal function with self care, mobility, lifting and ambulation. Modalities:     [x] GAME READY (VASO)- for significant edema, swelling, pain control.      Charges: 3/24  Timed Code Treatment Minutes: 39'    Total Treatment Minutes: 61'       [] EVAL (LOW) 89978 (typically 20 minutes face-to-face)  [] EVAL (MOD) 68615 (typically 30 minutes face-to-face)  [] EVAL (HIGH) 34316 (typically 45 minutes face-to-face)  [] RE-EVAL     [x] CS(13336) x   2  [] IONTO  [x] NMR (97723) x  1   [] VASO x   [] Manual (64390) x     [] Other: Gait training   [] TA x     [] Mech Traction (24807)  [] ES(attended) (05618)      [] ES (un) (68107):       ASSESSMENT:       GOALS: 3/21  Patient stated goal: [address] knee   []? Progressing: [x]? Met: []? Not Met: []? Adjusted   Patient stated goal: Walk without crutches  []? Progressing: [x]? Met: []? Not Met: []? Adjusted     Therapist goals for Patient:   Short Term Goals: To be achieved in: 2 weeks (3/17/22)  1. Independent in HEP and progression per patient tolerance, in order to prevent re-injury. []? Progressing: [x]? Met: []? Not Met: []? Adjusted   2. Patient will have a decrease in pain to facilitate improvement in movement, function, and ADLs as indicated by Functional Deficits. []? Progressing: [x]? Met: []? Not Met: []? Adjusted     Long Term Goals: To be achieved in: 6 weeks (4/14/22)  1. Disability index score of 30% or less for the [facility] to assist with reaching prior level of function. [x]? Progressing: []? Met: []? Not Met: []? Adjusted  2. Patient will demonstrate increased R knee AROM to 125 degrees to allow for proper joint functioning as indicated by patients Functional Deficits. []? Progressing: [x]? Met: []? Not Met: []? Adjusted  3. Patient will demonstrate an increase in R knee (Ext and flex) strength to 4/5 and good proximal hip strength (flex, ext, ABD) to 4/5 to allow for proper functional mobility as indicated by patients Functional Deficits. [x]? Progressing: []? Met: []? Not Met: []? Adjusted  4. Patient will return to home management functional activities without increased symptoms or restriction. [x]? Progressing: []? Met: []? Not Met: []? Adjusted  5. Patient will return to walking outside without increased pain or symptoms    []? Progressing: [x]? Met: []? Not Met: []? Adjusted            Overall Progression Towards Functional goals/ Treatment Progress Update:  [x] Patient is progressing as expected towards functional goals listed. [] Progression is slowed due to complexities/Impairments listed. [] Progression has been slowed due to co-morbidities.   [] Plan just implemented, too soon to assess goals progression <30days   [] Goals require adjustment due to lack of progress  [] Patient is not progressing as expected and requires additional follow up with physician  [] Other    Prognosis for POC: [x] Good [] Fair  [] Poor      Patient requires continued skilled intervention: [x] Yes  [] No    Treatment/Activity Tolerance:  [x] Patient tolerated treatment well [] Patient limited by fatique  [] Patient limited by pain  [] Patient limited by other medical complications  [] Other: Pt jacob session well. Patient demonstrated increase in LE strength on this date; as evidenced by the increase in resistance with strengthening exercises. She also tolerated additional strengthening exercises on this date; she also reported a muscle fatigue when performing the wall sit. 3/24      Patient education: Patient education on PT and plan of care including diagnosis, prognosis, treatment goals and options. Patient agrees with discussed POC and treatment and is aware of rehab process. 3/21    PLAN: 1 days per week for 4-6 Weeks (3/21 - 4/13)   [x] Continue per plan of care [] Alter current plan (see comments above)  [] Plan of care initiated [] Hold pending MD visit [] Discharge      Electronically signed by: Analisa Berry, student physical therapist  Therapist was present, directed the patient's care, made skilled judgement, and was responsible for assessment and treatment of the patient. Patient was in agreement to be treated by student physical therapist with licensed physical therapist present. Chanel Lo, PT, DPT     Note: If patient does not return for scheduled/ recommended follow up visits, this note will serve as a discharge from care along with most recent update on progress.

## 2022-03-28 ENCOUNTER — HOSPITAL ENCOUNTER (OUTPATIENT)
Dept: PHYSICAL THERAPY | Age: 52
Setting detail: THERAPIES SERIES
Discharge: HOME | End: 2022-03-28
Payer: COMMERCIAL

## 2022-03-28 NOTE — FLOWSHEET NOTE
Physical Therapy  Cancellation/No-show Note  Patient Name:  Osmar Cuevas  :  1970   Date:  3/28/2022  Cancelled visits to date: 0  No-shows to date: 01    For today's appointment patient:  []  Cancelled  []  Rescheduled appointment  [x]  No-show     Reason given by patient:  []  Patient ill  []  Conflicting appointment  []  No transportation    []  Conflict with work  [x]  No reason given  []  Other:     Comments:   Patient was made aware of appointment this morning. Patient did not come to scheduled time and front office called only to receive patient voicemail.     Electronically signed by:  Melody Xavier PT, PT

## 2022-04-04 ENCOUNTER — HOSPITAL ENCOUNTER (OUTPATIENT)
Dept: PHYSICAL THERAPY | Age: 52
Setting detail: THERAPIES SERIES
Discharge: HOME | End: 2022-04-04
Payer: COMMERCIAL

## 2022-04-04 PROCEDURE — 97110 THERAPEUTIC EXERCISES: CPT | Performed by: PHYSICAL THERAPIST

## 2022-04-04 PROCEDURE — 97530 THERAPEUTIC ACTIVITIES: CPT | Performed by: PHYSICAL THERAPIST

## 2022-04-04 NOTE — FLOWSHEET NOTE
[address] [facility],[address], [address] [facility], [address]  Phone: [phone]   Fax: [phone]  Physical Therapy Treatment Note/ Progress Report:       Date:  2022    Patient Name:  Apoorva Franklin    :  1970  MRN: [id number]  Restrictions/Precautions:    Medical/Treatment Diagnosis Information:  Diagnosis: S/p open incision R Knee Prepatellar bursectomy- M25.561, G89.29, M70.41- 22          Treatment Diagnosis: Decreased functional mobility, AROM, strength, ADL status, high-level IADLs, endurance, Increased Pain  Insurance/Certification information:  PT Insurance Information: [facility]  Physician Information:  Referring Practitioner: Dr. Ludwin York  Has the plan of care been signed (Y/N):        []  Yes  [x]  No     Date of Patient follow up with Physician:       Is this a Progress Report:     []  Yes  [x]  No        Progress report/Recertification will be due (10 Rx or 30 days whichever is less): 22      Visit # Insurance Allowable Auth Required   7 30 []  Yes [x]  No        Functional Scale:      WOMAC  3/3 - 59% Disability  3/21 - 58% Disability    Latex Allergy:  [x]NO      []YES  Preferred Language for Healthcare:   [x]English       []other:      Pain level:  0 /10       SUBJECTIVE:  Pt states she is doing much better. No new concerns or issues. Felt real good after the last session.        OBJECTIVE:  3/21    Observation:    Test measurements:        Flexibility L R Comment   Hamstring 23 degrees from 0 13 degrees from 0  90/90   ITB Negative Negative  Monica   Quad Limited Limited E[place]       ROM PROM AROM Comment     L R L R     Flexion   72 degrees  125 degrees 140 degrees     Extension     0 degrees 1 degrees of hyper extension          Strength L R Comment   Quad 4+/5 4-/5     Hamstring 4-/5 4-/5     Hip  flexion 4/5 3+/5     Hip Ext 4-/5 3+/5    Hip abd 4-/5 3+/5         RESTRICTIONS/PRECAUTIONS: Exercises/Interventions: 3/3      Exercise/Equipment Resistance/Repetitions Other comments   Stretching     Hamstring Fwd bend 5 x 30\" 4/4   Towel Pull Inclined Calf 30 sec x 5 4/4   Hip Flexion     ITB     Groin     Quad Prone quad stretch with band 3 x 30' 4/4        Bike  5'         SLR     Supine Abduction Adduction     Prone SLR+          Isometrics     Quad sets      Patellar Glides     Medial     Superior     Inferior          ROM     Sheet Pulls     Hang Weights     Passive     Active     Weight Shift     Ankle Pumps     Heel Slides            CKC     Calf raises     Wall sits 6 x 30\" 4/4   Step ups     1 leg stand Squatting     CC TKE Balance     bridges          Gait training  Step ups Banded Monster walk 3 x 35' blue band ^4/4   Banded walking sideways 3 x 35' blue band ^4/4   Standing Hip Ext Lateral Step ups 3 x 15 big green step  ^4/4   Sit to stands            PRE     Extension  RANGE:   Flexion  RANGE:        Quantum machines     Leg press  3 x 12 105# 4/4   Leg extension 3 x 12 40# 4/4   Leg curl 3 x 12 40# 4/4        Manual interventions                   Therapeutic Exercise and NMR EXR  [x] (58403) Provided verbal/tactile cueing for activities related to strengthening, flexibility, endurance, ROM for improvements in LE, proximal hip, and core control with self care, mobility, lifting, ambulation. [x] (11991) Provided verbal/tactile cueing for activities related to improving balance, coordination, kinesthetic sense, posture, motor skill, proprioception  to assist with LE, proximal hip, and core control in self care, mobility, lifting, ambulation and eccentric single leg control.      NMR and Therapeutic Activities:    [x] (26480 or 94232) Provided verbal/tactile cueing for activities related to improving balance, coordination, kinesthetic sense, posture, motor skill, proprioception and motor activation to allow for proper function of core, proximal hip and LE with self care and ADLs  [x] (92088) Gait Re-education- Provided training and instruction to the patient for proper LE, core and proximal hip recruitment and positioning and eccentric body weight control with ambulation re-education including up and down stairs     Home Exercise Program:    [x] (17884) Reviewed/Progressed HEP activities related to strengthening, flexibility, endurance, ROM of core, proximal hip and LE for functional self-care, mobility, lifting and ambulation/stair navigation   [x] (18603)Reviewed/Progressed HEP activities related to improving balance, coordination, kinesthetic sense, posture, motor skill, proprioception of core, proximal hip and LE for self care, mobility, lifting, and ambulation/stair navigation      Access Code: [email or web address]    Manual Treatments:  PROM / STM / Oscillations-Mobs:  G-I, II, III, IV (PA's, Inf., Post.)  [] (36597) Provided manual therapy to mobilize LE, proximal hip and/or LS spine soft tissue/joints for the purpose of modulating pain, promoting relaxation,  increasing ROM, reducing/eliminating soft tissue swelling/inflammation/restriction, improving soft tissue extensibility and allowing for proper ROM for normal function with self care, mobility, lifting and ambulation. Modalities:     [x] GAME READY (VASO)- for significant edema, swelling, pain control. Charges: 4/4  Timed Code Treatment Minutes: 48'    Total Treatment Minutes: 54'       [] EVAL (LOW) 67115 (typically 20 minutes face-to-face)  [] EVAL (MOD) 49835 (typically 30 minutes face-to-face)  [] EVAL (HIGH) 25167 (typically 45 minutes face-to-face)  [] RE-EVAL     [x] AV(64516) x   1  [] IONTO  [] NMR (09396) x     [] VASO x   [] Manual (16717) x     [] Other: Gait training   [x] TA x  2   [] Mech Traction (96829)  [] ES(attended) (77548)      [] ES (un) (83448):       ASSESSMENT:       GOALS: 3/21  Patient stated goal: Bend the R knee   []? Progressing: [x]? Met: []? Not Met: []?  Adjusted   Patient stated goal: Walk without crutches  []? Progressing: [x]? Met: []? Not Met: []? Adjusted     Therapist goals for Patient:   Short Term Goals: To be achieved in: 2 weeks (3/17/22)  1. Independent in HEP and progression per patient tolerance, in order to prevent re-injury. []? Progressing: [x]? Met: []? Not Met: []? Adjusted   2. Patient will have a decrease in pain to facilitate improvement in movement, function, and ADLs as indicated by Functional Deficits. []? Progressing: [x]? Met: []? Not Met: []? Adjusted     Long Term Goals: To be achieved in: 6 weeks (4/14/22)  1. Disability index score of 30% or less for the [facility] to assist with reaching prior level of function. [x]? Progressing: []? Met: []? Not Met: []? Adjusted  2. Patient will demonstrate increased R knee AROM to 125 degrees to allow for proper joint functioning as indicated by patients Functional Deficits. []? Progressing: [x]? Met: []? Not Met: []? Adjusted  3. Patient will demonstrate an increase in R knee (Ext and flex) strength to 4/5 and good proximal hip strength (flex, ext, ABD) to 4/5 to allow for proper functional mobility as indicated by patients Functional Deficits. [x]? Progressing: []? Met: []? Not Met: []? Adjusted  4. Patient will return to home management functional activities without increased symptoms or restriction. [x]? Progressing: []? Met: []? Not Met: []? Adjusted  5. Patient will return to walking outside without increased pain or symptoms    []? Progressing: [x]? Met: []? Not Met: []? Adjusted            Overall Progression Towards Functional goals/ Treatment Progress Update:  [x] Patient is progressing as expected towards functional goals listed. [] Progression is slowed due to complexities/Impairments listed. [] Progression has been slowed due to co-morbidities.   [] Plan just implemented, too soon to assess goals progression <30days   [] Goals require adjustment due to lack of progress  [] Patient is not progressing as expected and requires additional follow up with physician  [] Other    Prognosis for POC: [x] Good [] Fair  [] Poor      Patient requires continued skilled intervention: [x] Yes  [] No    Treatment/Activity Tolerance:  [x] Patient tolerated treatment well [] Patient limited by fatique  [] Patient limited by pain  [] Patient limited by other medical complications  [] Other: Pt jacob session well. Patient progressed her LE strengthening exercises to include increased reistance and repetitions; indicating her increase in strength and endurance. VCs were required on the wall sit for proper foot placement and depth with the sit. Patient stated feeling muscle fatigue at the end of the band lateral walks. 4/4      Patient education: Patient education on PT and plan of care including diagnosis, prognosis, treatment goals and options. Patient agrees with discussed POC and treatment and is aware of rehab process. 3/21    PLAN: 1 days per week for 4-6 Weeks (3/21 - 4/13)   [x] Continue per plan of care [] Alter current plan (see comments above)  [] Plan of care initiated [] Hold pending MD visit [] Discharge      Electronically signed by: Marycruz Chatman, student physical therapist  Therapist was present, directed the patient's care, made skilled judgement, and was responsible for assessment and treatment of the patient. Patient was in agreement to be treated by student physical therapist with licensed physical therapist present. Sesar Lee, PT, DPT     Note: If patient does not return for scheduled/ recommended follow up visits, this note will serve as a discharge from care along with most recent update on progress.

## 2022-04-11 ENCOUNTER — HOSPITAL ENCOUNTER (OUTPATIENT)
Dept: PHYSICAL THERAPY | Age: 52
Setting detail: THERAPIES SERIES
Discharge: HOME | End: 2022-04-11
Payer: COMMERCIAL

## 2022-04-11 PROCEDURE — 97110 THERAPEUTIC EXERCISES: CPT | Performed by: PHYSICAL THERAPIST

## 2022-04-11 PROCEDURE — 97530 THERAPEUTIC ACTIVITIES: CPT | Performed by: PHYSICAL THERAPIST

## 2022-04-11 NOTE — FLOWSHEET NOTE
[address] [place], [place] [address], [address] [facility], [address]  Phone: [phone]   Fax: [phone]  Physical Therapy Treatment Note/ Progress Report:       Date:  2022    Patient Name:  Darrion Goldberg    :  1970  MRN: [id number]  Restrictions/Precautions:    Medical/Treatment Diagnosis Information:  Diagnosis: S/p open incision R Knee Prepatellar bursectomy- M25.561, G89.29, M70.41- 22          Treatment Diagnosis: Decreased functional mobility, AROM, strength, ADL status, high-level IADLs, endurance, Increased Pain  Insurance/Certification information:  PT Insurance Information: [facility]  Physician Information:  Referring Practitioner: Dr. Renato Carbajal  Has the plan of care been signed (Y/N):        []  Yes  [x]  No     Date of Patient follow up with Physician:       Is this a Progress Report:     []  Yes  [x]  No        Progress report/Recertification will be due (10 Rx or 30 days whichever is less): 22      Visit # Insurance Allowable Auth Required   8 30 []  Yes [x]  No        Functional Scale:      WOMAC  3/3 - 59% Disability  3/21 - 58% Disability    Latex Allergy:  [x]NO      []YES  Preferred Language for Healthcare:   [x]English       []other:      Pain level:  0 /10       SUBJECTIVE:  Pt states that she is walking up and down steps much better. Felt some pain after the last session. Reported that it may have been the extra added weight to the exercises.        OBJECTIVE:  3/21    Observation:    Test measurements:        Flexibility L R Comment   Hamstring 23 degrees from 0 13 degrees from 0  90/90   ITB Negative Negative  Monica   Quad Limited Limited [place]       ROM PROM AROM Comment     L R L R     Flexion   72 degrees  125 degrees 140 degrees     Extension     0 degrees 1 degrees of hyper extension          Strength L R Comment   Quad 4+/5 4-/5     Hamstring 4-/5 4-/5     Hip  flexion 4/5 3+/5     Hip Ext 4-/5 3+/5 Hip abd 4-/5 3+/5         RESTRICTIONS/PRECAUTIONS:     Exercises/Interventions: 3/3      Exercise/Equipment Resistance/Repetitions Other comments   Stretching     Hamstring Fwd bend 5 x 30\" 4/11   Towel Pull Inclined Calf 30 sec x 5 4/11   Hip Flexion     ITB     Groin     Quad Prone quad stretch with band 3 x 30' 4/11        Bike  5' 4/11        SLR     Supine Abduction Adduction     Prone SLR+          Isometrics     Quad sets      Patellar Glides     Medial     Superior     Inferior          ROM     Sheet Pulls     Hang Weights     Passive     Active     Weight Shift     Ankle Pumps     Heel Slides            CKC     Calf raises     Wall sits 5 x 30\" 4/11   Step ups     1 leg stand Squatting     CC TKE Balance     bridges          Gait training  Step ups Banded Monster walk Banded walking sideways 2 x 35' blue band 4/11   Standing Hip Ext Lateral Step ups 3 x 15 #5 KB big green step  ^4/11   Sit to stands  Banded Marching in place 3 x 10 Added 4/11                                 PRE     Extension  RANGE:   Flexion  RANGE:        Quantum machines     Leg press  3 x 10 110# 4/11   Leg extension 3 x 10 45# 4/11   Leg curl 3 x 10 45# 4/11        Manual interventions                   Therapeutic Exercise and NMR EXR  [x] (90550) Provided verbal/tactile cueing for activities related to strengthening, flexibility, endurance, ROM for improvements in LE, proximal hip, and core control with self care, mobility, lifting, ambulation. [x] (43344) Provided verbal/tactile cueing for activities related to improving balance, coordination, kinesthetic sense, posture, motor skill, proprioception  to assist with LE, proximal hip, and core control in self care, mobility, lifting, ambulation and eccentric single leg control.      NMR and Therapeutic Activities:    [x] (79848 or 19636) Provided verbal/tactile cueing for activities related to improving balance, coordination, kinesthetic sense, posture, motor skill, proprioception and motor activation to allow for proper function of core, proximal hip and LE with self care and ADLs  [x] (06778) Gait Re-education- Provided training and instruction to the patient for proper LE, core and proximal hip recruitment and positioning and eccentric body weight control with ambulation re-education including up and down stairs     Home Exercise Program:    [x] (49384) Reviewed/Progressed HEP activities related to strengthening, flexibility, endurance, ROM of core, proximal hip and LE for functional self-care, mobility, lifting and ambulation/stair navigation   [x] (81748)Reviewed/Progressed HEP activities related to improving balance, coordination, kinesthetic sense, posture, motor skill, proprioception of core, proximal hip and LE for self care, mobility, lifting, and ambulation/stair navigation      Access Code: [email or web address]    Manual Treatments:  PROM / STM / Oscillations-Mobs:  G-I, II, III, IV (PA's, Inf., Post.)  [] (66544) Provided manual therapy to mobilize LE, proximal hip and/or LS spine soft tissue/joints for the purpose of modulating pain, promoting relaxation,  increasing ROM, reducing/eliminating soft tissue swelling/inflammation/restriction, improving soft tissue extensibility and allowing for proper ROM for normal function with self care, mobility, lifting and ambulation. Modalities:     [x] GAME READY (VASO)- for significant edema, swelling, pain control.      Charges: 4/11  Timed Code Treatment Minutes: 52'    Total Treatment Minutes: 47'       [] EVAL (LOW) 87928 (typically 20 minutes face-to-face)  [] EVAL (MOD) 26624 (typically 30 minutes face-to-face)  [] EVAL (HIGH) 90024 (typically 45 minutes face-to-face)  [] RE-EVAL     [x] JG(29069) x   1  [] IONTO  [] NMR (96762) x     [] VASO x   [] Manual (56622) x     [] Other: Gait training   [x] TA x  2   [] Mech Traction (12580)  [] ES(attended) (35225)      [] ES (un) (65506):       ASSESSMENT:       GOALS: 3/21  Patient stated goal: Bend the R knee   []? Progressing: [x]? Met: []? Not Met: []? Adjusted   Patient stated goal: Walk without crutches  []? Progressing: [x]? Met: []? Not Met: []? Adjusted     Therapist goals for Patient:   Short Term Goals: To be achieved in: 2 weeks (3/17/22)  1. Independent in HEP and progression per patient tolerance, in order to prevent re-injury. []? Progressing: [x]? Met: []? Not Met: []? Adjusted   2. Patient will have a decrease in pain to facilitate improvement in movement, function, and ADLs as indicated by Functional Deficits. []? Progressing: [x]? Met: []? Not Met: []? Adjusted     Long Term Goals: To be achieved in: 6 weeks (4/14/22)  1. Disability index score of 30% or less for the [facility] to assist with reaching prior level of function. [x]? Progressing: []? Met: []? Not Met: []? Adjusted  2. Patient will demonstrate increased R knee AROM to 125 degrees to allow for proper joint functioning as indicated by patients Functional Deficits. []? Progressing: [x]? Met: []? Not Met: []? Adjusted  3. Patient will demonstrate an increase in R knee (Ext and flex) strength to 4/5 and good proximal hip strength (flex, ext, ABD) to 4/5 to allow for proper functional mobility as indicated by patients Functional Deficits. [x]? Progressing: []? Met: []? Not Met: []? Adjusted  4. Patient will return to home management functional activities without increased symptoms or restriction. [x]? Progressing: []? Met: []? Not Met: []? Adjusted  5. Patient will return to walking outside without increased pain or symptoms    []? Progressing: [x]? Met: []? Not Met: []? Adjusted            Overall Progression Towards Functional goals/ Treatment Progress Update:  [x] Patient is progressing as expected towards functional goals listed. [] Progression is slowed due to complexities/Impairments listed. [] Progression has been slowed due to co-morbidities.   [] Plan just implemented, too soon to assess goals progression <30days   [] Goals require adjustment due to lack of progress  [] Patient is not progressing as expected and requires additional follow up with physician  [] Other    Prognosis for POC: [x] Good [] Fair  [] Poor      Patient requires continued skilled intervention: [x] Yes  [] No    Treatment/Activity Tolerance:  [x] Patient tolerated treatment well [] Patient limited by fatique  [] Patient limited by pain  [] Patient limited by other medical complications  [] Other: Pt jacob session well. LE strengthening and endurance continues to be the focus of treatment sessions. Additional hip flexor strengthening exercise was implemented; patient demonstrated exercise without increased symptoms of restriction. Needed VCs during lateral step up exercise to not let R knee go into a valgus position. Patient will be re-assessed for possible d/c at next appointment. Patient reported understanding of this at this time. 4/11      Patient education: Patient education on PT and plan of care including diagnosis, prognosis, treatment goals and options. Patient agrees with discussed POC and treatment and is aware of rehab process. 3/21    PLAN: 1 days per week for 4-6 Weeks (3/21 - 4/13)   [x] Continue per plan of care [] Alter current plan (see comments above)  [] Plan of care initiated [] Hold pending MD visit [] Discharge      Electronically signed by: Terra Hester, student physical therapist  Therapist was present, directed the patient's care, made skilled judgement, and was responsible for assessment and treatment of the patient. Patient was in agreement to be treated by student physical therapist with licensed physical therapist present. Gely Allan, PT, DPT     Note: If patient does not return for scheduled/ recommended follow up visits, this note will serve as a discharge from care along with most recent update on progress.

## 2022-04-18 ENCOUNTER — HOSPITAL ENCOUNTER (OUTPATIENT)
Dept: PHYSICAL THERAPY | Age: 52
Setting detail: THERAPIES SERIES
Discharge: HOME | End: 2022-04-18
Payer: COMMERCIAL

## 2022-04-18 NOTE — FLOWSHEET NOTE
Physical Therapy  Cancellation/No-show Note  Patient Name:  Asher Walker  :  1970   Date:  2022  Cancelled visits to date: 0  No-shows to date: 01    For today's appointment patient:  []  Cancelled  []  Rescheduled appointment  [x]  No-show     Reason given by patient:  []  Patient ill  []  Conflicting appointment  []  No transportation    []  Conflict with work  [x]  No reason given  []  Other:     Comments:   Attempted to leave patient voicemail, but it is not set up.      Electronically signed by:  Josey Pinto, PT, DPT

## 2022-06-09 ENCOUNTER — TELEPHONE (OUTPATIENT)
Dept: OTHER | Facility: CLINIC | Age: 52
End: 2022-06-09

## 2022-06-09 NOTE — TELEPHONE ENCOUNTER
Brayden Smith was contacted today as part of [facility] to schedule a mammogram.         I left a message reminding the patient that they have an open order from [facility], [place] and need to contact me to schedule a mammogram.    Inell Early, [address]

## (undated) DEVICE — STOCKINETTE,IMPERVIOUS,12X48,STERILE: Brand: MEDLINE

## (undated) DEVICE — 3M™ STERI-DRAPE™ U-DRAPE 1015: Brand: STERI-DRAPE™

## (undated) DEVICE — SUTURE VCRL + SZ 0 L18IN ABSRB UD L36MM CT-1 1/2 CIR VCP840D

## (undated) DEVICE — SYRINGE MED 10ML LUERLOCK TIP W/O SFTY DISP

## (undated) DEVICE — PADDING UNDERCAST W4INXL4YD 100% COT CRIMPED FINISH WBRL II

## (undated) DEVICE — ZIMMER® STERILE DISPOSABLE TOURNIQUET CUFF WITH PLC, DUAL PORT, SINGLE BLADDER, 34 IN. (86 CM)

## (undated) DEVICE — STERILE LATEX POWDER-FREE SURGICAL GLOVESWITH NITRILE COATING: Brand: PROTEXIS

## (undated) DEVICE — PADDING CAST N ADH 12X6 IN CRIMPED FINISH 100% COTTON WBRLII

## (undated) DEVICE — MAJOR SET UP: Brand: MEDLINE INDUSTRIES, INC.

## (undated) DEVICE — T-DRAPE,EXTREMITY,STERILE: Brand: MEDLINE

## (undated) DEVICE — ELECTRODE PT RET AD L9FT HI MOIST COND ADH HYDRGEL CORDED

## (undated) DEVICE — PADDING CAST W6INXL4YD NONSTERILE COT RAYON MICROPLEATED

## (undated) DEVICE — DRAPE,U/SHT,SPLIT,FILM,60X84,STERILE: Brand: MEDLINE

## (undated) DEVICE — SYSTEM SKIN CLOSURE 42CM DERMABOND PRINEO

## (undated) DEVICE — STERILE POLYISOPRENE POWDER-FREE SURGICAL GLOVES: Brand: PROTEXIS

## (undated) DEVICE — BANDAGE COMPR W6INXL10YD ST M E WHITE/BEIGE

## (undated) DEVICE — SOLUTION IV IRRIG POUR BRL 0.9% SODIUM CHL 2F7124

## (undated) DEVICE — SPONGE GZ W4XL4IN COT 12 PLY TYP VII WVN C FLD DSGN

## (undated) DEVICE — ALCOHOL RUBBING 16OZ 70% ISO

## (undated) DEVICE — GOWN SIRUS NONREIN XL W/TWL: Brand: MEDLINE INDUSTRIES, INC.

## (undated) DEVICE — SUTURE MCRYL + SZ 4-0 L18IN ABSRB UD L19MM PS-2 3/8 CIR MCP496G

## (undated) DEVICE — 3M™ COBAN™ NL STERILE NON-LATEX SELF-ADHERENT WRAP, 2086S, 6 IN X 5 YD (15 CM X 4,5 M), 12 ROLLS/CASE: Brand: 3M™ COBAN™

## (undated) DEVICE — APPLICATOR MEDICATED 26 CC SOLUTION HI LT ORNG CHLORAPREP

## (undated) DEVICE — MERCY HEALTH WEST TURNOVER: Brand: MEDLINE INDUSTRIES, INC.

## (undated) DEVICE — INTENDED FOR TISSUE SEPARATION, AND OTHER PROCEDURES THAT REQUIRE A SHARP SURGICAL BLADE TO PUNCTURE OR CUT.: Brand: BARD-PARKER ® STAINLESS STEEL BLADES

## (undated) DEVICE — BANDAGE COMPR W6INXL12FT SMOOTH FOR LIMB EXSANG ESMARCH

## (undated) DEVICE — SUTURE VCRL + SZ 2-0 L18IN ABSRB UD CT1 L36MM 1/2 CIR VCP839D